# Patient Record
Sex: MALE | Race: WHITE | NOT HISPANIC OR LATINO | Employment: UNEMPLOYED | URBAN - METROPOLITAN AREA
[De-identification: names, ages, dates, MRNs, and addresses within clinical notes are randomized per-mention and may not be internally consistent; named-entity substitution may affect disease eponyms.]

---

## 2018-01-24 ENCOUNTER — APPOINTMENT (OUTPATIENT)
Dept: RADIOLOGY | Facility: MEDICAL CENTER | Age: 43
End: 2018-01-24
Attending: EMERGENCY MEDICINE
Payer: MEDICAID

## 2018-01-24 ENCOUNTER — HOSPITAL ENCOUNTER (EMERGENCY)
Facility: MEDICAL CENTER | Age: 43
End: 2018-01-24
Attending: EMERGENCY MEDICINE
Payer: MEDICAID

## 2018-01-24 VITALS
RESPIRATION RATE: 18 BRPM | SYSTOLIC BLOOD PRESSURE: 143 MMHG | BODY MASS INDEX: 36.45 KG/M2 | WEIGHT: 315 LBS | DIASTOLIC BLOOD PRESSURE: 108 MMHG | HEART RATE: 89 BPM | TEMPERATURE: 97.6 F | OXYGEN SATURATION: 94 % | HEIGHT: 78 IN

## 2018-01-24 DIAGNOSIS — M79.671 ACUTE FOOT PAIN, RIGHT: ICD-10-CM

## 2018-01-24 PROCEDURE — 73630 X-RAY EXAM OF FOOT: CPT | Mod: RT

## 2018-01-24 PROCEDURE — 99284 EMERGENCY DEPT VISIT MOD MDM: CPT

## 2018-01-24 PROCEDURE — A9270 NON-COVERED ITEM OR SERVICE: HCPCS | Performed by: EMERGENCY MEDICINE

## 2018-01-24 PROCEDURE — 700102 HCHG RX REV CODE 250 W/ 637 OVERRIDE(OP): Performed by: EMERGENCY MEDICINE

## 2018-01-24 RX ORDER — ACETAMINOPHEN 325 MG/1
650 TABLET ORAL ONCE
Status: COMPLETED | OUTPATIENT
Start: 2018-01-24 | End: 2018-01-24

## 2018-01-24 RX ORDER — IBUPROFEN 600 MG/1
600 TABLET ORAL ONCE
Status: COMPLETED | OUTPATIENT
Start: 2018-01-24 | End: 2018-01-24

## 2018-01-24 RX ORDER — IBUPROFEN 200 MG
800 TABLET ORAL EVERY 6 HOURS PRN
Status: SHIPPED | COMMUNITY
End: 2018-11-19

## 2018-01-24 RX ADMIN — IBUPROFEN 600 MG: 600 TABLET, FILM COATED ORAL at 16:36

## 2018-01-24 RX ADMIN — ACETAMINOPHEN 650 MG: 325 TABLET, FILM COATED ORAL at 16:36

## 2018-01-24 ASSESSMENT — PAIN SCALES - GENERAL: PAINLEVEL_OUTOF10: 8

## 2018-01-24 NOTE — ED NOTES
"Chief Complaint   Patient presents with   • Foot Pain     Pain and swelling to right foot for past 6 days, denies injury.      /108   Pulse (!) 102   Temp 36.4 °C (97.6 °F)   Resp 18   Ht 2.032 m (6' 8\")   Wt (!) 184.1 kg (405 lb 13.9 oz)   SpO2 95%   BMI 44.59 kg/m²     "

## 2018-01-25 NOTE — ED NOTES
Patient refused splint, given crutches per orders. Discharge instructions provided.  Pt verbalized the understanding of discharge instructions to follow up with PCP and to return to ER if condition worsens.  Pt ambulated out of ER without difficulty.

## 2018-01-25 NOTE — ED PROVIDER NOTES
"ED Provider Note    CHIEF COMPLAINT   Chief Complaint   Patient presents with   • Foot Pain     Pain and swelling to right foot for past 6 days, denies injury.        HPI   Fernandez Magallanes is a 43 y.o. male who presents to the emergency department with a chief complaint of right foot pain. The patient localizes the pain to the 2nd and 3rd toes and to the distal metatarsals. This is at her torso movement. There is no specific trauma. He is uncertain whether or not he may be kicked the bed frame in the middle of night. He's not noticed any redness or warmth.    REVIEW OF SYSTEMS   See HPI for further details. All other systems are negative.     PAST MEDICAL HISTORY   History reviewed. No pertinent past medical history.    FAMILY HISTORY  History reviewed. No pertinent family history.    SOCIAL HISTORY  Social History     Social History   • Marital status:      Spouse name: N/A   • Number of children: N/A   • Years of education: N/A     Social History Main Topics   • Smoking status: Never Smoker   • Smokeless tobacco: Never Used   • Alcohol use No   • Drug use: No   • Sexual activity: Not on file     Other Topics Concern   • Not on file     Social History Narrative   • No narrative on file       SURGICAL HISTORY  History reviewed. No pertinent surgical history.    CURRENT MEDICATIONS   Home Medications     Reviewed by Matthew Lawrence (Pharmacy Tech) on 01/24/18 at 1611  Med List Status: Complete   Medication Last Dose Status   ibuprofen (MOTRIN) 200 MG Tab > 2 days Active                ALLERGIES   No Known Allergies    PHYSICAL EXAM  VITAL SIGNS: /108   Pulse (!) 102   Temp 36.4 °C (97.6 °F)   Resp 18   Ht 2.032 m (6' 8\")   Wt (!) 184.1 kg (405 lb 13.9 oz)   SpO2 95%   BMI 44.59 kg/m²   Constitutional: Well developed, Well nourished, No acute distress, Non-toxic appearance.   Cardiovascular: Normal heart rate, Normal rhythm, No murmurs, No rubs, No gallops.   Thorax & Lungs: Normal breath " sounds, No respiratory distress, No wheezing, No chest tenderness.   Abdomen: Bowel sounds normal, Soft, No tenderness, No masses, No pulsatile masses.   Skin: Warm, Dry, No erythema, No rash.   Extremities: Intact distal pulses, No cyanosis, No clubbing.   Musculoskeletal: Tenderness to palpation of the 2nd and 3rd toes and the distal metatarsals of the right foot. No overlying erythema. No increased warmth. No external evidence of trauma.  Neurologic: Alert & oriented x 3, Normal motor function, Normal sensory function, No focal deficits noted.     RADIOLOGY/PROCEDURES  DX-FOOT-COMPLETE 3+ RIGHT   Final Result      1.  2 benign-appearing cysts in the proximal metaphysis of the middle phalanx of the 2nd and 3rd toes.      2.  No acute fracture or dislocation identified.            COURSE & MEDICAL DECISION MAKING  Pertinent Labs & Imaging studies reviewed. (See chart for details)    Patient presents today with right foot pain. X-ray does not reveal any evidence of fracture or dislocation. I'll treat the patient conservatively and give him a postop shoe. He is to minimize weightbearing and will provide crutches. He sees over-the-counter nonsteroidal anti-inflammatories for pain control. Should the patient pain that is not resolved after one week he is to follow-up at the [facility].        FINAL IMPRESSION  1. Acute foot pain, right            Electronically signed by: John Salgado, 1/24/2018 4:59 PM

## 2018-01-25 NOTE — DISCHARGE INSTRUCTIONS
Musculoskeletal Pain  Musculoskeletal pain is muscle and ameena aches and pains. These pains can occur in any part of the body. Your caregiver may treat you without knowing the cause of the pain. They may treat you if blood or urine tests, X-rays, and other tests were normal.   CAUSES  There is often not a definite cause or reason for these pains. These pains may be caused by a type of germ (virus). The discomfort may also come from overuse. Overuse includes working out too hard when your body is not fit. Ameena aches also come from weather changes. Bone is sensitive to atmospheric pressure changes.  HOME CARE INSTRUCTIONS   · Ask when your test results will be ready. Make sure you get your test results.  · Only take over-the-counter or prescription medicines for pain, discomfort, or fever as directed by your caregiver. If you were given medications for your condition, do not drive, operate machinery or power tools, or sign legal documents for 24 hours. Do not drink alcohol. Do not take sleeping pills or other medications that may interfere with treatment.  · Continue all activities unless the activities cause more pain. When the pain lessens, slowly resume normal activities. Gradually increase the intensity and duration of the activities or exercise.  · During periods of severe pain, bed rest may be helpful. Lay or sit in any position that is comfortable.  · Putting ice on the injured area.  ¨ Put ice in a bag.  ¨ Place a towel between your skin and the bag.  ¨ Leave the ice on for 15 to 20 minutes, 3 to 4 times a day.  · Follow up with your caregiver for continued problems and no reason can be found for the pain. If the pain becomes worse or does not go away, it may be necessary to repeat tests or do additional testing. Your caregiver may need to look further for a possible cause.  SEEK IMMEDIATE MEDICAL CARE IF:  · You have pain that is getting worse and is not relieved by medications.  · You develop chest pain  that is associated with shortness or breath, sweating, feeling sick to your stomach (nauseous), or throw up (vomit).  · Your pain becomes localized to the abdomen.  · You develop any new symptoms that seem different or that concern you.  MAKE SURE YOU:   · Understand these instructions.  · Will watch your condition.  · Will get help right away if you are not doing well or get worse.     This information is not intended to replace advice given to you by your health care provider. Make sure you discuss any questions you have with your health care provider.     Document Released: 12/18/2006 Document Revised: 03/11/2013 Document Reviewed: 08/22/2014  [employer] Interactive Patient Education ©2016 [employer]

## 2018-11-19 ENCOUNTER — HOSPITAL ENCOUNTER (EMERGENCY)
Facility: MEDICAL CENTER | Age: 43
End: 2018-11-19
Attending: EMERGENCY MEDICINE
Payer: MEDICAID

## 2018-11-19 ENCOUNTER — APPOINTMENT (OUTPATIENT)
Dept: RADIOLOGY | Facility: MEDICAL CENTER | Age: 43
End: 2018-11-19
Attending: EMERGENCY MEDICINE
Payer: MEDICAID

## 2018-11-19 VITALS
HEIGHT: 78 IN | BODY MASS INDEX: 36.45 KG/M2 | HEART RATE: 92 BPM | SYSTOLIC BLOOD PRESSURE: 149 MMHG | DIASTOLIC BLOOD PRESSURE: 117 MMHG | RESPIRATION RATE: 18 BRPM | OXYGEN SATURATION: 97 % | TEMPERATURE: 96.9 F | WEIGHT: 315 LBS

## 2018-11-19 DIAGNOSIS — R10.9 FLANK PAIN: ICD-10-CM

## 2018-11-19 DIAGNOSIS — M79.18 PAIN IN ABDOMINAL MUSCLE OF LEFT FLANK: ICD-10-CM

## 2018-11-19 LAB
ALBUMIN SERPL BCP-MCNC: 3.5 G/DL (ref 3.2–4.9)
ALBUMIN/GLOB SERPL: 1.2 G/DL
ALP SERPL-CCNC: 80 U/L (ref 30–99)
ALT SERPL-CCNC: 33 U/L (ref 2–50)
ANION GAP SERPL CALC-SCNC: 8 MMOL/L (ref 0–11.9)
APPEARANCE UR: CLEAR
AST SERPL-CCNC: 27 U/L (ref 12–45)
BASOPHILS # BLD AUTO: 0.7 % (ref 0–1.8)
BASOPHILS # BLD: 0.06 K/UL (ref 0–0.12)
BILIRUB SERPL-MCNC: 0.6 MG/DL (ref 0.1–1.5)
BILIRUB UR QL STRIP.AUTO: NEGATIVE
BUN SERPL-MCNC: 9 MG/DL (ref 8–22)
CALCIUM SERPL-MCNC: 8.6 MG/DL (ref 8.4–10.2)
CHLORIDE SERPL-SCNC: 103 MMOL/L (ref 96–112)
CO2 SERPL-SCNC: 24 MMOL/L (ref 20–33)
COLOR UR: YELLOW
CREAT SERPL-MCNC: 1.02 MG/DL (ref 0.5–1.4)
EOSINOPHIL # BLD AUTO: 0.3 K/UL (ref 0–0.51)
EOSINOPHIL NFR BLD: 3.6 % (ref 0–6.9)
EPI CELLS #/AREA URNS HPF: ABNORMAL /HPF
ERYTHROCYTE [DISTWIDTH] IN BLOOD BY AUTOMATED COUNT: 44.3 FL (ref 35.9–50)
GLOBULIN SER CALC-MCNC: 3 G/DL (ref 1.9–3.5)
GLUCOSE SERPL-MCNC: 162 MG/DL (ref 65–99)
GLUCOSE UR STRIP.AUTO-MCNC: NEGATIVE MG/DL
HCT VFR BLD AUTO: 46.4 % (ref 42–52)
HGB BLD-MCNC: 15.3 G/DL (ref 14–18)
IMM GRANULOCYTES # BLD AUTO: 0.02 K/UL (ref 0–0.11)
IMM GRANULOCYTES NFR BLD AUTO: 0.2 % (ref 0–0.9)
KETONES UR STRIP.AUTO-MCNC: NEGATIVE MG/DL
LEUKOCYTE ESTERASE UR QL STRIP.AUTO: NEGATIVE
LIPASE SERPL-CCNC: 39 U/L (ref 7–58)
LYMPHOCYTES # BLD AUTO: 1.89 K/UL (ref 1–4.8)
LYMPHOCYTES NFR BLD: 22.5 % (ref 22–41)
MCH RBC QN AUTO: 27.3 PG (ref 27–33)
MCHC RBC AUTO-ENTMCNC: 33 G/DL (ref 33.7–35.3)
MCV RBC AUTO: 82.7 FL (ref 81.4–97.8)
MICRO URNS: ABNORMAL
MONOCYTES # BLD AUTO: 0.57 K/UL (ref 0–0.85)
MONOCYTES NFR BLD AUTO: 6.8 % (ref 0–13.4)
MUCOUS THREADS #/AREA URNS HPF: ABNORMAL /HPF
NEUTROPHILS # BLD AUTO: 5.55 K/UL (ref 1.82–7.42)
NEUTROPHILS NFR BLD: 66.2 % (ref 44–72)
NITRITE UR QL STRIP.AUTO: NEGATIVE
NRBC # BLD AUTO: 0 K/UL
NRBC BLD-RTO: 0 /100 WBC
PH UR STRIP.AUTO: 7 [PH]
PLATELET # BLD AUTO: 301 K/UL (ref 164–446)
PMV BLD AUTO: 10.2 FL (ref 9–12.9)
POTASSIUM SERPL-SCNC: 3.5 MMOL/L (ref 3.6–5.5)
PROT SERPL-MCNC: 6.5 G/DL (ref 6–8.2)
PROT UR QL STRIP: NEGATIVE MG/DL
RBC # BLD AUTO: 5.61 M/UL (ref 4.7–6.1)
RBC # URNS HPF: ABNORMAL /HPF
RBC UR QL AUTO: ABNORMAL
SODIUM SERPL-SCNC: 135 MMOL/L (ref 135–145)
SP GR UR STRIP.AUTO: 1.02
WBC # BLD AUTO: 8.4 K/UL (ref 4.8–10.8)
WBC #/AREA URNS HPF: ABNORMAL /HPF

## 2018-11-19 PROCEDURE — 81001 URINALYSIS AUTO W/SCOPE: CPT

## 2018-11-19 PROCEDURE — 700111 HCHG RX REV CODE 636 W/ 250 OVERRIDE (IP)

## 2018-11-19 PROCEDURE — 85025 COMPLETE CBC W/AUTO DIFF WBC: CPT

## 2018-11-19 PROCEDURE — 36415 COLL VENOUS BLD VENIPUNCTURE: CPT

## 2018-11-19 PROCEDURE — 700105 HCHG RX REV CODE 258: Performed by: EMERGENCY MEDICINE

## 2018-11-19 PROCEDURE — 96375 TX/PRO/DX INJ NEW DRUG ADDON: CPT

## 2018-11-19 PROCEDURE — 96374 THER/PROPH/DIAG INJ IV PUSH: CPT

## 2018-11-19 PROCEDURE — 74176 CT ABD & PELVIS W/O CONTRAST: CPT

## 2018-11-19 PROCEDURE — 83690 ASSAY OF LIPASE: CPT

## 2018-11-19 PROCEDURE — 80053 COMPREHEN METABOLIC PANEL: CPT

## 2018-11-19 PROCEDURE — 700111 HCHG RX REV CODE 636 W/ 250 OVERRIDE (IP): Performed by: EMERGENCY MEDICINE

## 2018-11-19 PROCEDURE — 99284 EMERGENCY DEPT VISIT MOD MDM: CPT

## 2018-11-19 PROCEDURE — 76705 ECHO EXAM OF ABDOMEN: CPT

## 2018-11-19 RX ORDER — LIDOCAINE 50 MG/G
1 PATCH TOPICAL EVERY 24 HOURS
Qty: 10 PATCH | Refills: 0 | Status: SHIPPED | OUTPATIENT
Start: 2018-11-19 | End: 2019-08-27

## 2018-11-19 RX ORDER — SODIUM CHLORIDE 9 MG/ML
1000 INJECTION, SOLUTION INTRAVENOUS ONCE
Status: COMPLETED | OUTPATIENT
Start: 2018-11-19 | End: 2018-11-19

## 2018-11-19 RX ORDER — CYCLOBENZAPRINE HCL 10 MG
10 TABLET ORAL 3 TIMES DAILY PRN
Qty: 30 TAB | Refills: 0 | Status: SHIPPED | OUTPATIENT
Start: 2018-11-19 | End: 2019-08-27

## 2018-11-19 RX ORDER — IBUPROFEN 800 MG/1
800 TABLET ORAL EVERY 8 HOURS PRN
Qty: 30 TAB | Refills: 0 | Status: SHIPPED | OUTPATIENT
Start: 2018-11-19 | End: 2019-08-27

## 2018-11-19 RX ORDER — MORPHINE SULFATE 4 MG/ML
4 INJECTION, SOLUTION INTRAMUSCULAR; INTRAVENOUS ONCE
Status: COMPLETED | OUTPATIENT
Start: 2018-11-19 | End: 2018-11-19

## 2018-11-19 RX ORDER — PREDNISONE 20 MG/1
40 TABLET ORAL DAILY
Qty: 10 TAB | Refills: 0 | Status: SHIPPED | OUTPATIENT
Start: 2018-11-19 | End: 2018-11-24

## 2018-11-19 RX ORDER — KETOROLAC TROMETHAMINE 30 MG/ML
30 INJECTION, SOLUTION INTRAMUSCULAR; INTRAVENOUS ONCE
Status: COMPLETED | OUTPATIENT
Start: 2018-11-19 | End: 2018-11-19

## 2018-11-19 RX ORDER — KETOROLAC TROMETHAMINE 30 MG/ML
INJECTION, SOLUTION INTRAMUSCULAR; INTRAVENOUS
Status: COMPLETED
Start: 2018-11-19 | End: 2018-11-19

## 2018-11-19 RX ADMIN — MORPHINE SULFATE 4 MG: 4 INJECTION INTRAVENOUS at 09:09

## 2018-11-19 RX ADMIN — SODIUM CHLORIDE 1000 ML: 9 INJECTION, SOLUTION INTRAVENOUS at 07:03

## 2018-11-19 RX ADMIN — KETOROLAC TROMETHAMINE 30 MG: 30 INJECTION, SOLUTION INTRAMUSCULAR at 07:04

## 2018-11-19 RX ADMIN — KETOROLAC TROMETHAMINE 30 MG: 30 INJECTION, SOLUTION INTRAMUSCULAR; INTRAVENOUS at 07:04

## 2018-11-19 ASSESSMENT — PAIN SCALES - GENERAL
PAINLEVEL_OUTOF10: 8
PAINLEVEL_OUTOF10: 4

## 2018-11-19 ASSESSMENT — ENCOUNTER SYMPTOMS
DIARRHEA: 0
DIZZINESS: 0
FEVER: 0
ABDOMINAL PAIN: 0
HEADACHES: 0
BACK PAIN: 1
VOMITING: 0
SHORTNESS OF BREATH: 0
CHILLS: 0
FLANK PAIN: 1
NAUSEA: 0
DIAPHORESIS: 1

## 2018-11-19 ASSESSMENT — PAIN DESCRIPTION - DESCRIPTORS: DESCRIPTORS: SHARP

## 2018-11-19 NOTE — ED PROVIDER NOTES
ED Provider Note    ED Provider Note    Primary care provider: Pcp Pt States None  Means of arrival: POV  History obtained from: Patient  History limited by: None    CHIEF COMPLAINT  Chief Complaint   Patient presents with   • Flank Pain     L side x6days, progressively worse, was intermittent - now continuous       HPI  Fernandez Magallanes is a 43 y.o. male who presents to the Emergency Department with significant other with a chief complaint of left flank pain.  Patient states his been on and off for the last approximately 1 week however in the last 12 hours, it been constant.  Previously, he is he was able to find a certain position that was more comfortable but again, in the last 12 hours, nothing seems to make it better or worse.  He is never had prior similar symptoms in.  He denies any radiation to his abdomen or otherwise, with this pain.  No fever.  No nausea or vomiting.  No diarrhea.  No urinary symptoms.  No hematuria.  He has a past medical history of hypertension and congestive heart failure.  But no known kidney or lung problems.  He denies any recent trauma or falls.  Patient denies significant alcohol or drug intake.    REVIEW OF SYSTEMS  Review of Systems   Constitutional: Positive for diaphoresis. Negative for chills and fever.   HENT: Negative for congestion.    Respiratory: Negative for shortness of breath.    Cardiovascular: Negative for chest pain.   Gastrointestinal: Negative for abdominal pain, diarrhea, nausea and vomiting.   Genitourinary: Positive for flank pain.   Musculoskeletal: Positive for back pain.   Neurological: Negative for dizziness and headaches.   All other systems reviewed and are negative.      PAST MEDICAL HISTORY  Hypertension, congestive heart failure    SURGICAL HISTORY   has a past surgical history that includes knee replacement, total (Left) and knee replacement, total (Right).    SOCIAL HISTORY  Social History   Substance Use Topics   • Smoking status: Never Smoker   •  "Smokeless tobacco: Never Used   • Alcohol use No      History   Drug Use No       FAMILY HISTORY  History reviewed. No pertinent family history.    CURRENT MEDICATIONS  Home Medications     Reviewed by Matthew Cheatham (Pharmacy Tech) on 11/19/18 at 0732  Med List Status: Complete   Medication Last Dose Status   ibuprofen (MOTRIN) 200 MG Tab 11/19/2018 Active                ALLERGIES  No Known Allergies    PHYSICAL EXAM  VITAL SIGNS: /117   Pulse 92   Temp 36.1 °C (96.9 °F) (Oral)   Resp 18   Ht 2.032 m (6' 8\")   Wt (!) 156.2 kg (344 lb 5.7 oz)   SpO2 97%   BMI 37.83 kg/m²   Vitals reviewed.  Constitutional: Patient is oriented to person, place, and time. Appears well-developed and well-nourished.  Mild distress.    Head: Normocephalic and atraumatic.   Ears: Normal external ears bilaterally.   Mouth/Throat: Oropharynx is clear and moist  Eyes: Conjunctivae are normal. Pupils are equal, round, and reactive to light.   Cardiovascular: Tachycardia, regular rhythm and normal heart sounds. Normal peripheral pulses.  Pulmonary/Chest: Effort normal and breath sounds normal. No respiratory distress, no wheezes, rhonchi, or rales.   Abdominal: Soft. Bowel sounds are normal. There is no tenderness. No rebound or guarding, or peritoneal signs.  Left CVA tenderness.  Musculoskeletal: No edema and no tenderness.   Neurological: No focal deficits.   Skin: Skin is warm.  Patient is diaphoretic.  no erythema. No pallor.   Psychiatric: Patient has a normal mood and affect.     LABS  Results for orders placed or performed during the hospital encounter of 11/19/18   CBC WITH DIFFERENTIAL   Result Value Ref Range    WBC 8.4 4.8 - 10.8 K/uL    RBC 5.61 4.70 - 6.10 M/uL    Hemoglobin 15.3 14.0 - 18.0 g/dL    Hematocrit 46.4 42.0 - 52.0 %    MCV 82.7 81.4 - 97.8 fL    MCH 27.3 27.0 - 33.0 pg    MCHC 33.0 (L) 33.7 - 35.3 g/dL    RDW 44.3 35.9 - 50.0 fL    Platelet Count 301 164 - 446 K/uL    MPV 10.2 9.0 - 12.9 fL    " Neutrophils-Polys 66.20 44.00 - 72.00 %    Lymphocytes 22.50 22.00 - 41.00 %    Monocytes 6.80 0.00 - 13.40 %    Eosinophils 3.60 0.00 - 6.90 %    Basophils 0.70 0.00 - 1.80 %    Immature Granulocytes 0.20 0.00 - 0.90 %    Nucleated RBC 0.00 /100 WBC    Neutrophils (Absolute) 5.55 1.82 - 7.42 K/uL    Lymphs (Absolute) 1.89 1.00 - 4.80 K/uL    Monos (Absolute) 0.57 0.00 - 0.85 K/uL    Eos (Absolute) 0.30 0.00 - 0.51 K/uL    Baso (Absolute) 0.06 0.00 - 0.12 K/uL    Immature Granulocytes (abs) 0.02 0.00 - 0.11 K/uL    NRBC (Absolute) 0.00 K/uL   COMP METABOLIC PANEL   Result Value Ref Range    Sodium 135 135 - 145 mmol/L    Potassium 3.5 (L) 3.6 - 5.5 mmol/L    Chloride 103 96 - 112 mmol/L    Co2 24 20 - 33 mmol/L    Anion Gap 8.0 0.0 - 11.9    Glucose 162 (H) 65 - 99 mg/dL    Bun 9 8 - 22 mg/dL    Creatinine 1.02 0.50 - 1.40 mg/dL    Calcium 8.6 8.4 - 10.2 mg/dL    AST(SGOT) 27 12 - 45 U/L    ALT(SGPT) 33 2 - 50 U/L    Alkaline Phosphatase 80 30 - 99 U/L    Total Bilirubin 0.6 0.1 - 1.5 mg/dL    Albumin 3.5 3.2 - 4.9 g/dL    Total Protein 6.5 6.0 - 8.2 g/dL    Globulin 3.0 1.9 - 3.5 g/dL    A-G Ratio 1.2 g/dL   LIPASE   Result Value Ref Range    Lipase 39 7 - 58 U/L   URINALYSIS CULTURE, IF INDICATED   Result Value Ref Range    Color Yellow     Character Clear     Specific Gravity 1.020 <1.035    Ph 7.0 5.0 - 8.0    Glucose Negative Negative mg/dL    Ketones Negative Negative mg/dL    Protein Negative Negative mg/dL    Bilirubin Negative Negative    Nitrite Negative Negative    Leukocyte Esterase Negative Negative    Occult Blood Trace (A) Negative    Micro Urine Req Microscopic    URINE MICROSCOPIC (W/UA)   Result Value Ref Range    WBC Rare (A) /hpf    RBC 0-2 (A) /hpf    Epithelial Cells Rare Few /hpf    Mucous Threads Few /hpf   ESTIMATED GFR   Result Value Ref Range    GFR If African American >60 >60 mL/min/1.73 m 2    GFR If Non African American >60 >60 mL/min/1.73 m 2       All labs reviewed by  me.    RADIOLOGY  US-RUQ   Final Result      1.  Thickening gallbladder wall which is a nonspecific finding and can be seen in variety of conditions to include hepatocellular dysfunction, hyperplastic cholecystosis disease as well as cholecystitis.      2.  No gallstones or biliary ductal dilatation. No para cholecystic fluid.      3.  Hepatomegaly.      4.  Borderline dilated portal vein.      CT-RENAL COLIC EVALUATION(A/P W/O)   Final Result      1.  Small bilateral renal calyceal stones which measure up to 3 mm in size. There is no evidence of ureteral stone or evidence of hydronephrosis.      2.  Inflammatory stranding seen surrounding the gallbladder and extending into the periportal region. Consideration should be given for cholecystitis.      3.  Normal appendix.        The radiologist's interpretation of all radiological studies have been reviewed by me.    COURSE & MEDICAL DECISION MAKING  Pertinent Labs & Imaging studies reviewed. (See chart for details)    Obtained and reviewed past medical records.  Patient has only 1 other encounter, it was in January of this year, for foot pain.    6:35 AM - Patient seen and examined at bedside.  This is a pleasant, 43-year-old male who presents with a week of intermittent left flank pain, now constant pain.  He is tachycardic.  Patient will be given IV fluids for abnormal vital signs, tachycardia and the possibility that surgical intervention will be necessary, he will not be given oral fluids.  She will be treated with Toradol.  IV started per nursing protocols.  Differential diagnosis includes but not limited to: Kidney stone, pancreatitis, muscle strain.    8:55 AM is reevaluated at the bedside.  He reports only minimal change in pain.  Originally, he states his pain was 8 out of 10 now he reports that 6 out of 10.  We discussed CT findings which show evidence of bilateral nonobstructing kidney stones but suspicion for gallbladder inflammation and I have advised  him of plan of care to obtain ultrasound at this time.  He will also be given pain medication.  He will continue to be kept n.p.o. for possible need for surgical intervention.  I have advised him, labs were overall unrevealing other than, an elevated sugar of 162 and a slightly low potassium 3.5.  LFTs and lipase normal.    11:40 AM data reviewed.  Her sound results noted.  T showed concern for possible cholecystitis despite the patient not having any right upper quadrant pain and normal liver enzymes.  Ultrasound does not show evidence of acute cholecystitis.    11:41 AM patient's reevaluated the bedside.  Only minimal change.  We discussed ultrasound findings.  No evidence of acute cholecystitis.  Now is considering more likely musculoskeletal injury after lifting.  I suggested treatment with lidocaine patch, anti-inflammatories, short course of steroids and a muscle relaxer.  He is also to avoid lifting bending and activities that cause pain.  Of advised application of ice.  At this time, I feel he can safely be discharged home.  He is well-appearing and nontoxic with normal vital signs.    She will be discharged home in stable condition.      FINAL IMPRESSION  1. Flank pain    2. Pain in abdominal muscle of left flank

## 2018-11-19 NOTE — ED TRIAGE NOTES
"Chief Complaint   Patient presents with   • Flank Pain     L side x6days, progressively worse, was intermittent - now continuous   /117   Pulse (!) 106   Temp 36.1 °C (96.9 °F) (Oral)   Resp 18   Ht 2.032 m (6' 8\")   Wt (!) 156.2 kg (344 lb 5.7 oz)   SpO2 95%   BMI 37.83 kg/m²      Pt frequently repositions self; unable to find comfortable position. Denies n/v or urinary changes. Denies change in appetite. Reports 'I thought this was just back pain, but it keeps getting worse.'  "

## 2018-11-19 NOTE — ED NOTES
Discharge instructions provided and discussed.  Rx provided and discussed.  Patient verbalizes a need to follow up with a PCP to have his BP treated.

## 2019-03-23 ENCOUNTER — HOSPITAL ENCOUNTER (EMERGENCY)
Facility: MEDICAL CENTER | Age: 44
End: 2019-03-23
Attending: EMERGENCY MEDICINE
Payer: MEDICAID

## 2019-03-23 VITALS
SYSTOLIC BLOOD PRESSURE: 152 MMHG | TEMPERATURE: 98.7 F | HEIGHT: 78 IN | BODY MASS INDEX: 36.45 KG/M2 | DIASTOLIC BLOOD PRESSURE: 114 MMHG | RESPIRATION RATE: 16 BRPM | HEART RATE: 90 BPM | OXYGEN SATURATION: 96 % | WEIGHT: 315 LBS

## 2019-03-23 DIAGNOSIS — F15.10 METHAMPHETAMINE ABUSE (HCC): ICD-10-CM

## 2019-03-23 DIAGNOSIS — I10 HYPERTENSION, UNSPECIFIED TYPE: ICD-10-CM

## 2019-03-23 DIAGNOSIS — Z00.8 MEDICAL CLEARANCE FOR INCARCERATION: ICD-10-CM

## 2019-03-23 PROCEDURE — 99284 EMERGENCY DEPT VISIT MOD MDM: CPT

## 2019-03-23 PROCEDURE — A9270 NON-COVERED ITEM OR SERVICE: HCPCS | Performed by: EMERGENCY MEDICINE

## 2019-03-23 PROCEDURE — 700102 HCHG RX REV CODE 250 W/ 637 OVERRIDE(OP): Performed by: EMERGENCY MEDICINE

## 2019-03-23 RX ORDER — CLONIDINE HYDROCHLORIDE 0.1 MG/1
0.1 TABLET ORAL ONCE
Status: COMPLETED | OUTPATIENT
Start: 2019-03-23 | End: 2019-03-23

## 2019-03-23 RX ADMIN — CLONIDINE HYDROCHLORIDE 0.1 MG: 0.1 TABLET ORAL at 08:30

## 2019-03-23 NOTE — ED TRIAGE NOTES
Patient to ED via [facility] for medical clearance. Patient was found to be hypertensive at the station but denies any associated sx. Does take BP meds and took them this morning but does not remember what they are. Patient did also use meth this morning.

## 2019-03-23 NOTE — ED NOTES
Pt provided with water per the ERP. Pt remains in handcuffs and with a [facility]  at bedside. Pt is calm, cooperative and appropriate.

## 2019-03-23 NOTE — ED PROVIDER NOTES
ED Provider Note    CHIEF COMPLAINT  Chief Complaint   Patient presents with   • Medical Clearance       HPI  Fernandez Magallanes is a 44 y.o. male who presents in custody of the police, found to be hypertensive.  Patient states history of similar.  He also used methamphetamine this morning.  No chest pain or headache.  No difficulty breathing.  He denies head injury.  Patient requires medical clearance to continue on to snf.  Patient states he does not currently take medication for his blood pressure.    REVIEW OF SYSTEMS  Constitutional: No fever  Respiratory: No shortness of breath  Cardiac: No chest pain  Gastrointestinal: No abdominal pain  Musculoskeletal: No acute back pain    PAST MEDICAL HISTORY  Past Medical History:   Diagnosis Date   • Congestive heart failure (HCC)    • Hypertension        FAMILY HISTORY  No family history on file.    SOCIAL HISTORY  Social History     Social History   • Marital status:      Spouse name: N/A   • Number of children: N/A   • Years of education: N/A     Social History Main Topics   • Smoking status: Never Smoker   • Smokeless tobacco: Never Used   • Alcohol use No   • Drug use: No   • Sexual activity: Not on file     Other Topics Concern   • Not on file     Social History Narrative   • No narrative on file       SURGICAL HISTORY  Past Surgical History:   Procedure Laterality Date   • KNEE REPLACEMENT, TOTAL Left    • KNEE REPLACEMENT, TOTAL Right        CURRENT MEDICATIONS  No current facility-administered medications on file prior to encounter.      Current Outpatient Prescriptions on File Prior to Encounter   Medication Sig Dispense Refill   • lidocaine (LIDODERM) 5 % Patch Apply 1 Patch to skin as directed every 24 hours. 10 Patch 0   • cyclobenzaprine (FLEXERIL) 10 MG Tab Take 1 Tab by mouth 3 times a day as needed. 30 Tab 0   • ibuprofen (MOTRIN) 800 MG Tab Take 1 Tab by mouth every 8 hours as needed. 30 Tab 0       ALLERGIES  No Known Allergies    PHYSICAL  "EXAM  VITAL SIGNS: /114   Pulse 90   Temp 37.1 °C (98.7 °F) (Temporal)   Resp 16   Ht 2.032 m (6' 8\")   Wt (!) 158.8 kg (350 lb)   SpO2 96%   BMI 38.45 kg/m²   Constitutional:  Well nourished, No acute distress.   HENT: Face is atraumatic.  No epistaxis  GI: Abdomen is soft and nontender  Eyes:  Conjunctiva normal, No discharge.  Pupils are equal, no nystagmus  Cardiovascular: The heart is regular rhythm, normal rate  Pulmonary: Lungs are clear, no crackles or wheezing  Skin: No cyanosis.  No asymmetric edema the lower extremities  Musculoskeletal: Neck nontender   Neurologic: speech is clear, no ataxia.  Strength normal  Psychiatric:  Mood depressed.  Cooperative      COURSE & MEDICAL DECISION MAKING  Pertinent Labs & Imaging studies reviewed. (See chart for details)  After clonidine, blood pressure improved, down to 146/96.  Pressure then wavered slightly up and down.  At this time patient remains asymptomatic.  Suspect of hypertension today to be combination of pre-existing disease as well as methamphetamine use.  Patient is advised to return for headache, chest pain.  He is discharged stable condition care and please    FINAL IMPRESSION     1. Medical clearance for incarceration    2. Methamphetamine abuse (HCC)    3. Hypertension, unspecified type                 Electronically signed by: Justin Rai, 3/23/2019 1:25 PM    "

## 2019-03-23 NOTE — ED NOTES
Pt responsive to clonidine, but PD still not comfortable with where BP is. ERP aware. ERP clearing patient to go to snf.

## 2019-08-27 DIAGNOSIS — Z01.810 PRE-OPERATIVE CARDIOVASCULAR EXAMINATION: ICD-10-CM

## 2019-08-27 LAB — EKG IMPRESSION: NORMAL

## 2019-08-27 PROCEDURE — 93005 ELECTROCARDIOGRAM TRACING: CPT | Performed by: SURGERY

## 2019-08-27 PROCEDURE — 93010 ELECTROCARDIOGRAM REPORT: CPT | Performed by: INTERNAL MEDICINE

## 2019-08-27 RX ORDER — CARVEDILOL 6.25 MG/1
6.25 TABLET ORAL 2 TIMES DAILY WITH MEALS
COMMUNITY
Start: 2018-12-13

## 2019-08-30 ENCOUNTER — ANESTHESIA EVENT (OUTPATIENT)
Dept: SURGERY | Facility: MEDICAL CENTER | Age: 44
End: 2019-08-30
Payer: MEDICAID

## 2019-08-30 ENCOUNTER — HOSPITAL ENCOUNTER (OUTPATIENT)
Facility: MEDICAL CENTER | Age: 44
End: 2019-08-30
Attending: SURGERY | Admitting: SURGERY
Payer: MEDICAID

## 2019-08-30 ENCOUNTER — ANESTHESIA (OUTPATIENT)
Dept: SURGERY | Facility: MEDICAL CENTER | Age: 44
End: 2019-08-30
Payer: MEDICAID

## 2019-08-30 VITALS
HEART RATE: 54 BPM | RESPIRATION RATE: 16 BRPM | TEMPERATURE: 96.8 F | HEIGHT: 78 IN | WEIGHT: 315 LBS | OXYGEN SATURATION: 93 % | BODY MASS INDEX: 36.45 KG/M2

## 2019-08-30 DIAGNOSIS — G89.18 POSTOPERATIVE PAIN: ICD-10-CM

## 2019-08-30 PROCEDURE — 160002 HCHG RECOVERY MINUTES (STAT): Performed by: SURGERY

## 2019-08-30 PROCEDURE — 160047 HCHG PACU  - EA ADDL 30 MINS PHASE II: Performed by: SURGERY

## 2019-08-30 PROCEDURE — 501583 HCHG TROCAR, THRD CAN&SEAL 5X100: Performed by: SURGERY

## 2019-08-30 PROCEDURE — A4314 CATH W/DRAINAGE 2-WAY LATEX: HCPCS | Performed by: SURGERY

## 2019-08-30 PROCEDURE — 160046 HCHG PACU - 1ST 60 MINS PHASE II: Performed by: SURGERY

## 2019-08-30 PROCEDURE — A6402 STERILE GAUZE <= 16 SQ IN: HCPCS | Performed by: SURGERY

## 2019-08-30 PROCEDURE — 501568 HCHG TROCAR, BLUNTPORT 12MM: Performed by: SURGERY

## 2019-08-30 PROCEDURE — 700101 HCHG RX REV CODE 250: Performed by: SURGERY

## 2019-08-30 PROCEDURE — 700105 HCHG RX REV CODE 258: Performed by: SURGERY

## 2019-08-30 PROCEDURE — 160048 HCHG OR STATISTICAL LEVEL 1-5: Performed by: SURGERY

## 2019-08-30 PROCEDURE — 502571 HCHG PACK, LAP CHOLE: Performed by: SURGERY

## 2019-08-30 PROCEDURE — C1781 MESH (IMPLANTABLE): HCPCS | Performed by: SURGERY

## 2019-08-30 PROCEDURE — 160039 HCHG SURGERY MINUTES - EA ADDL 1 MIN LEVEL 3: Performed by: SURGERY

## 2019-08-30 PROCEDURE — 160025 RECOVERY II MINUTES (STATS): Performed by: SURGERY

## 2019-08-30 PROCEDURE — 700111 HCHG RX REV CODE 636 W/ 250 OVERRIDE (IP): Performed by: ANESTHESIOLOGY

## 2019-08-30 PROCEDURE — 700101 HCHG RX REV CODE 250: Performed by: ANESTHESIOLOGY

## 2019-08-30 PROCEDURE — 502240 HCHG MISC OR SUPPLY RC 0272: Performed by: SURGERY

## 2019-08-30 PROCEDURE — 160035 HCHG PACU - 1ST 60 MINS PHASE I: Performed by: SURGERY

## 2019-08-30 PROCEDURE — 160028 HCHG SURGERY MINUTES - 1ST 30 MINS LEVEL 3: Performed by: SURGERY

## 2019-08-30 PROCEDURE — 501570 HCHG TROCAR, SEPARATOR: Performed by: SURGERY

## 2019-08-30 PROCEDURE — 160009 HCHG ANES TIME/MIN: Performed by: SURGERY

## 2019-08-30 DEVICE — MESH 3D MAX RIGHT 10.8 X 16CM - LARGE (1EA/CA): Type: IMPLANTABLE DEVICE | Status: FUNCTIONAL

## 2019-08-30 RX ORDER — MEPERIDINE HYDROCHLORIDE 25 MG/ML
12.5 INJECTION INTRAMUSCULAR; INTRAVENOUS; SUBCUTANEOUS
Status: DISCONTINUED | OUTPATIENT
Start: 2019-08-30 | End: 2019-08-30 | Stop reason: HOSPADM

## 2019-08-30 RX ORDER — SODIUM CHLORIDE, SODIUM LACTATE, POTASSIUM CHLORIDE, CALCIUM CHLORIDE 600; 310; 30; 20 MG/100ML; MG/100ML; MG/100ML; MG/100ML
INJECTION, SOLUTION INTRAVENOUS CONTINUOUS
Status: DISCONTINUED | OUTPATIENT
Start: 2019-08-30 | End: 2019-08-30 | Stop reason: HOSPADM

## 2019-08-30 RX ORDER — HYDROCODONE BITARTRATE AND ACETAMINOPHEN 5; 325 MG/1; MG/1
1-2 TABLET ORAL EVERY 6 HOURS PRN
Qty: 12 TAB | Refills: 0 | Status: SHIPPED | OUTPATIENT
Start: 2019-08-30 | End: 2019-09-02

## 2019-08-30 RX ORDER — OXYCODONE HYDROCHLORIDE AND ACETAMINOPHEN 5; 325 MG/1; MG/1
1 TABLET ORAL
Status: DISCONTINUED | OUTPATIENT
Start: 2019-08-30 | End: 2019-08-30 | Stop reason: HOSPADM

## 2019-08-30 RX ORDER — ONDANSETRON 2 MG/ML
INJECTION INTRAMUSCULAR; INTRAVENOUS PRN
Status: DISCONTINUED | OUTPATIENT
Start: 2019-08-30 | End: 2019-08-30 | Stop reason: SURG

## 2019-08-30 RX ORDER — ONDANSETRON 2 MG/ML
4 INJECTION INTRAMUSCULAR; INTRAVENOUS
Status: COMPLETED | OUTPATIENT
Start: 2019-08-30 | End: 2019-08-30

## 2019-08-30 RX ORDER — HALOPERIDOL 5 MG/ML
1 INJECTION INTRAMUSCULAR
Status: DISCONTINUED | OUTPATIENT
Start: 2019-08-30 | End: 2019-08-30 | Stop reason: HOSPADM

## 2019-08-30 RX ORDER — KETOROLAC TROMETHAMINE 30 MG/ML
30 INJECTION, SOLUTION INTRAMUSCULAR; INTRAVENOUS ONCE
Status: COMPLETED | OUTPATIENT
Start: 2019-08-30 | End: 2019-08-30

## 2019-08-30 RX ORDER — HYDROMORPHONE HYDROCHLORIDE 1 MG/ML
0.1 INJECTION, SOLUTION INTRAMUSCULAR; INTRAVENOUS; SUBCUTANEOUS
Status: DISCONTINUED | OUTPATIENT
Start: 2019-08-30 | End: 2019-08-30 | Stop reason: HOSPADM

## 2019-08-30 RX ORDER — BUPIVACAINE HYDROCHLORIDE AND EPINEPHRINE 5; 5 MG/ML; UG/ML
INJECTION, SOLUTION EPIDURAL; INTRACAUDAL; PERINEURAL
Status: DISCONTINUED | OUTPATIENT
Start: 2019-08-30 | End: 2019-08-30 | Stop reason: HOSPADM

## 2019-08-30 RX ORDER — OXYCODONE HYDROCHLORIDE AND ACETAMINOPHEN 5; 325 MG/1; MG/1
2 TABLET ORAL
Status: DISCONTINUED | OUTPATIENT
Start: 2019-08-30 | End: 2019-08-30 | Stop reason: HOSPADM

## 2019-08-30 RX ORDER — HYDROMORPHONE HYDROCHLORIDE 1 MG/ML
0.2 INJECTION, SOLUTION INTRAMUSCULAR; INTRAVENOUS; SUBCUTANEOUS
Status: DISCONTINUED | OUTPATIENT
Start: 2019-08-30 | End: 2019-08-30 | Stop reason: HOSPADM

## 2019-08-30 RX ORDER — ROCURONIUM BROMIDE 10 MG/ML
INJECTION, SOLUTION INTRAVENOUS PRN
Status: DISCONTINUED | OUTPATIENT
Start: 2019-08-30 | End: 2019-08-30 | Stop reason: SURG

## 2019-08-30 RX ORDER — CEFAZOLIN SODIUM 1 G/3ML
INJECTION, POWDER, FOR SOLUTION INTRAMUSCULAR; INTRAVENOUS PRN
Status: DISCONTINUED | OUTPATIENT
Start: 2019-08-30 | End: 2019-08-30 | Stop reason: SURG

## 2019-08-30 RX ORDER — DIPHENHYDRAMINE HYDROCHLORIDE 50 MG/ML
12.5 INJECTION INTRAMUSCULAR; INTRAVENOUS
Status: DISCONTINUED | OUTPATIENT
Start: 2019-08-30 | End: 2019-08-30 | Stop reason: HOSPADM

## 2019-08-30 RX ORDER — HYDROMORPHONE HYDROCHLORIDE 1 MG/ML
0.4 INJECTION, SOLUTION INTRAMUSCULAR; INTRAVENOUS; SUBCUTANEOUS
Status: DISCONTINUED | OUTPATIENT
Start: 2019-08-30 | End: 2019-08-30 | Stop reason: HOSPADM

## 2019-08-30 RX ADMIN — PROPOFOL 250 MG: 10 INJECTION, EMULSION INTRAVENOUS at 14:01

## 2019-08-30 RX ADMIN — KETOROLAC TROMETHAMINE 30 MG: 30 INJECTION, SOLUTION INTRAMUSCULAR at 16:26

## 2019-08-30 RX ADMIN — SODIUM CHLORIDE, POTASSIUM CHLORIDE, SODIUM LACTATE AND CALCIUM CHLORIDE: 600; 310; 30; 20 INJECTION, SOLUTION INTRAVENOUS at 11:46

## 2019-08-30 RX ADMIN — FENTANYL CITRATE 50 MCG: 50 INJECTION, SOLUTION INTRAMUSCULAR; INTRAVENOUS at 15:06

## 2019-08-30 RX ADMIN — LIDOCAINE HYDROCHLORIDE 0.5 ML: 10 INJECTION, SOLUTION INFILTRATION; PERINEURAL at 11:46

## 2019-08-30 RX ADMIN — ROCURONIUM BROMIDE 50 MG: 10 INJECTION, SOLUTION INTRAVENOUS at 13:56

## 2019-08-30 RX ADMIN — SUGAMMADEX 200 MG: 100 INJECTION, SOLUTION INTRAVENOUS at 14:52

## 2019-08-30 RX ADMIN — ONDANSETRON 4 MG: 2 INJECTION INTRAMUSCULAR; INTRAVENOUS at 15:30

## 2019-08-30 RX ADMIN — ONDANSETRON 4 MG: 2 INJECTION INTRAMUSCULAR; INTRAVENOUS at 14:52

## 2019-08-30 RX ADMIN — CEFAZOLIN 2 G: 1 INJECTION, POWDER, FOR SOLUTION INTRAVENOUS at 13:46

## 2019-08-30 RX ADMIN — SUCCINYLCHOLINE CHLORIDE 100 MG: 20 INJECTION, SOLUTION INTRAMUSCULAR; INTRAVENOUS at 13:56

## 2019-08-30 RX ADMIN — FENTANYL CITRATE 250 MCG: 50 INJECTION, SOLUTION INTRAMUSCULAR; INTRAVENOUS at 13:56

## 2019-08-30 NOTE — DISCHARGE INSTR - OTHER INFO
Discharge home when alert, comfortable, ambulatory, and tolerating PO well.  Pt counseled re: diet, activity, home med's, and wound care.  Regular diet.  May shower over Tegaderms tomorrow.   Ok to remove Tegaderms on 9/02/19. May continue to shower once bandages removed, but no baths/soaks x 2 weeks.  No driving for 4-5 days.  No lifting >15 lbs for 3-4 weeks.  F/U with Dr. Ganser in 1-2 weeks

## 2019-08-30 NOTE — ANESTHESIA PREPROCEDURE EVALUATION
Relevant Problems   No relevant active problems       Physical Exam    Airway   Mallampati: II  TM distance: >3 FB  Neck ROM: full       Cardiovascular - normal exam  Rhythm: regular  Rate: normal  (-) murmur     Dental - normal exam         Pulmonary - normal exam  Breath sounds clear to auscultation     Abdominal    Neurological - normal exam                 Anesthesia Plan    ASA 3   ASA physical status 3 criteria: morbid obesity - BMI greater than or equal to 40    Plan - general       Airway plan will be ETT  (Chf)      Induction: intravenous    Postoperative Plan: Postoperative administration of opioids is intended.    Pertinent diagnostic labs and testing reviewed    Informed Consent:    Anesthetic plan and risks discussed with patient.    Use of blood products discussed with: patient whom consented to blood products.

## 2019-08-30 NOTE — OR NURSING
1452 received from or  resp spont  abd soft w  ith 3 dressings c/d/i  Medicated for mild pain with good results 1530 dressings remain c/d/i  Medicated for slight nausea 1545 nausea gone per pt  Meets discharge criteria

## 2019-08-30 NOTE — OR SURGEON
Immediate Post OP Note    PreOp Diagnosis: Right inguinal hernia    PostOp Diagnosis: Same    Procedure(s):  REPAIR, HERNIA, RIGHT INGUINAL, LAPAROSCOPIC - Wound Class: Clean    Surgeon(s):  John H Ganser, M.D.    Anesthesiologist/Type of Anesthesia:  Anesthesiologist: Scotty Schwartz M.D./General    Surgical Staff:  Assistant: KURT Winter  Circulator: Emily Pacheco R.N.  Scrub Person: Joe Santana    Specimens removed if any:  * No specimens in log *    Estimated Blood Loss: -    Findings: Indirect    Complications: 0        8/30/2019 2:44 PM John H Ganser, M.D.

## 2019-08-30 NOTE — OR NURSING
1635- Discharge instructions provided to patient and girlfriend. Both verbalized understanding. All questions and concerns addressed. Awaiting arrival of ride.   1710- Patient D/Jhon to care of family following an uneventful stay in Stage 2.

## 2019-08-30 NOTE — ANESTHESIA PROCEDURE NOTES
Airway  Date/Time: 8/30/2019 1:57 PM  Performed by: Scotty Schwartz M.D.  Authorized by: Scotty Schwartz M.D.     Location:  OR  Urgency:  Elective  Indications for Airway Management:  Anesthesia  Spontaneous Ventilation: absent    Sedation Level:  Deep  Preoxygenated: Yes    Patient Position:  Sniffing  Final Airway Type:  Endotracheal airway  Final Endotracheal Airway:  ETT  Cuffed: Yes    Technique Used for Successful ETT Placement:  Direct laryngoscopy  Insertion Site:  Oral  Blade Type:  Torin  Laryngoscope Blade/Videolaryngoscope Blade Size:  4  ETT Size (mm):  8.0  Measured from:  Teeth  ETT to Teeth (cm):  26  Placement Verified by: auscultation and capnometry    Cormack-Lehane Classification:  Grade I - full view of glottis  Number of Attempts at Approach:  1

## 2019-08-30 NOTE — DISCHARGE INSTRUCTIONS
ACTIVITY: Rest and take it easy for the first 24 hours.  A responsible adult is recommended to remain with you during that time.  It is normal to feel sleepy.  We encourage you to not do anything that requires balance, judgment or coordination.    MILD FLU-LIKE SYMPTOMS ARE NORMAL. YOU MAY EXPERIENCE GENERALIZED MUSCLE ACHES, THROAT IRRITATION, HEADACHE AND/OR SOME NAUSEA.    FOR 24 HOURS DO NOT:  Drive, operate machinery or run household appliances.  Drink beer or alcoholic beverages.   Make important decisions or sign legal documents.    SPECIAL INSTRUCTIONS: No heavy lifting until ok's by Md    DIET: To avoid nausea, slowly advance diet as tolerated, avoiding spicy or greasy foods for the first day.  Add more substantial food to your diet according to your physician's instructions.  Babies can be fed formula or breast milk as soon as they are hungry.  INCREASE FLUIDS AND FIBER TO AVOID CONSTIPATION.    SURGICAL DRESSING/BATHING: Keep clean dry and intact per Md instructions    FOLLOW-UP APPOINTMENT:  A follow-up appointment should be arranged with your doctor in ; call to schedule.    You should CALL YOUR PHYSICIAN if you develop:  Fever greater than 101 degrees F.  Pain not relieved by medication, or persistent nausea or vomiting.  Excessive bleeding (blood soaking through dressing) or unexpected drainage from the wound.  Extreme redness or swelling around the incision site, drainage of pus or foul smelling drainage.  Inability to urinate or empty your bladder within 8 hours.  Problems with breathing or chest pain.    You should call 911 if you develop problems with breathing or chest pain.  If you are unable to contact your doctor or surgical center, you should go to the nearest emergency room or urgent care center.  Physician's telephone #: [phone]    If any questions arise, call your doctor.  If your doctor is not available, please feel free to call the Surgical Center at {Surgical Dept Numbers:[phone]}.   The Center is open Monday through Friday from 7AM to 7PM.  You can also call the HEALTH HOTLINE open 24 hours/day, 7 days/week and speak to a nurse at [phone], or toll free at [phone].    A registered nurse may call you a few days after your surgery to see how you are doing after your procedure.    MEDICATIONS: Resume taking daily medication.  Take prescribed pain medication with food.  If no medication is prescribed, you may take non-aspirin pain medication if needed.  PAIN MEDICATION CAN BE VERY CONSTIPATING.  Take a stool softener or laxative such as senokot, pericolace, or milk of magnesia if needed.    Prescription given for Norco.  Last pain medication given at     If your physician has prescribed pain medication that includes Acetaminophen (Tylenol), do not take additional Acetaminophen (Tylenol) while taking the prescribed medication.    Depression / Suicide Risk    As you are discharged from this [facility] facility, it is important to learn how to keep safe from harming yourself.    Recognize the warning signs:  · Abrupt changes in personality, positive or negative- including increase in energy   · Giving away possessions  · Change in eating patterns- significant weight changes-  positive or negative  · Change in sleeping patterns- unable to sleep or sleeping all the time   · Unwillingness or inability to communicate  · Depression  · Unusual sadness, discouragement and loneliness  · Talk of wanting to die  · Neglect of personal appearance   · Rebelliousness- reckless behavior  · Withdrawal from people/activities they love  · Confusion- inability to concentrate     If you or a loved one observes any of these behaviors or has concerns about self-harm, here's what you can do:  · Talk about it- your feelings and reasons for harming yourself  · Remove any means that you might use to hurt yourself (examples: pills, rope, extension cords, firearm)  · Get professional help from the community  (Mental Health, Substance Abuse, psychological counseling)  · Do not be alone:Call your Safe Contact- someone whom you trust who will be there for you.  · Call your local CRISIS HOTLINE 763-9334 or 924-688-5410  · Call your local Children's Mobile Crisis Response Team [place] (170) 635-1692 or www.InfoHubble  · Call the toll free National Suicide Prevention Hotlines   · National Suicide Prevention Lifeline 815-439-NZVQ (0348)  · National Hope Line Network 800-SUICIDE (901-8548)

## 2019-08-30 NOTE — OP REPORT
DATE OF SERVICE:  08/30/2019    PREOPERATIVE DIAGNOSIS:  Right inguinal hernia.    POSTOPERATIVE DIAGNOSIS:  Indirect right inguinal hernia.    PROCEDURE PERFORMED:  Laparoscopic repair of indirect right inguinal hernia   with mesh, large Bard 3DMax.    SURGEON:  John H. Ganser, MD    ASSISTANT:  Jose Go PA-C    ANESTHESIA:  General.    ANESTHESIOLOGIST:  Scotty Schwartz MD    INDICATIONS:  The patient is a 44-year-old male who has developed an enlarging   right groin bulge over the last couple of months.  Ultrasound confirms   hernia.  Risks, benefits, and alternatives to laparoscopic repair of right   inguinal hernia repair with mesh were outlined in detail.  All questions   answered and he wished to proceed.    DESCRIPTION OF PROCEDURE:  The patient was identified and general anesthetic   administered.  His abdomen was prepped and draped in the usual sterile   fashion.  Local anesthesia of 0.5% Marcaine with epinephrine was injected   prior to making skin incision.  A small incision was made transversely below   the umbilicus and dissection carried through the subcutaneous tissues down to   the fascia.  The fascia was opened vertically short distance and a plane   created between the musculature of the posterior fascia.  Balloon dissector   was inserted into this plane and advanced.  The camera was inserted and the   balloon inflated.  Initially, it appeared to be in good position, but the   peritoneum tore on inflating the balloon off to the right side.  This was   removed and the Agueda balloon trocar was inserted and the peritoneum   insufflated with carbon dioxide.  The tear was below the posterior fascial   layer.  The posterior fascial layer was left intact below the fascial   incision.  Five millimeter trocars were placed on either side of midline under   direct vision.  The peritoneum was then scored anteriorly along the abdominal   wall and a preperitoneal plane developed.  This was carried medially  down to   the pubis and laterally to accommodate mesh.  The patient had an obvious   indirect inguinal hernia.  Hernia sac was carefully dissected away from the   cord structures and reduced further posteriorly to accommodate mesh.  A large   right-sided Bard 3DMax mesh was then inserted and placed in the center of the   mesh over the internal ring.  The peritoneum was then tacked back to itself   with the secure strap absorbable tacking device.  Inspection of the left side   showed no evidence of hernia.  The peritoneum was then deflated maintaining   the hernia sac and peritoneum flush against the mesh to prevent any folding.    The trocars were withdrawn.  The umbilical fascia closed with 0 Vicryl and   skin incisions with 4-0 Vicryl subcuticular sutures.  Sterile dressings were   applied.  The patient returned to the recovery room in stable condition.       ____________________________________     JOHN H. GANSER, MD    [facility] / [facility]    DD:  08/30/2019 14:48:27  DT:  08/30/2019 15:00:53    D#:  [id number]  Job#:  [id number]    cc: CARTER JIANG MD, Carlos Mirza DO

## 2019-08-30 NOTE — ANESTHESIA POSTPROCEDURE EVALUATION
Patient: Fernandez Magallanes    Procedure Summary     Date:  08/30/19 Room / Location:   OR 01 / SURGERY [facility]    Anesthesia Start:  1346 Anesthesia Stop:  1453    Procedure:  REPAIR, HERNIA, INGUINAL, LAPAROSCOPIC (Right ) Diagnosis:  (HERNIA INGUINAL RIGHT)    Surgeon:  John H Ganser, M.D. Responsible Provider:  Scotty Schwartz M.D.    Anesthesia Type:  general ASA Status:  3          Final Anesthesia Type: general  Last vitals  BP   NIBP: 140/96    Temp   36.3 °C (97.3 °F)    Pulse       Resp   18    SpO2   93 %      Anesthesia Post Evaluation    Patient location during evaluation: PACU  Patient participation: complete - patient participated  Level of consciousness: awake and alert    Airway patency: patent  Anesthetic complications: no  Cardiovascular status: hemodynamically stable  Respiratory status: acceptable  Hydration status: euvolemic    PONV: none           Nurse Pain Score: 0 (NPRS)

## 2019-08-30 NOTE — ANESTHESIA TIME REPORT
Anesthesia Start and Stop Event Times     Date Time Event    8/30/2019 1337 Ready for Procedure     1346 Anesthesia Start     1501 Anesthesia Stop        Responsible Staff  08/30/19    Name Role Begin End    Scotty Schwartz M.D. Anesth 1346 1501        Preop Diagnosis (Free Text):  Pre-op Diagnosis     HERNIA INGUINAL RIGHT        Preop Diagnosis (Codes):    Post op Diagnosis  Inguinal hernia      Premium Reason  A. 3PM - 7AM    Comments:

## 2022-07-08 ENCOUNTER — APPOINTMENT (OUTPATIENT)
Dept: RADIOLOGY | Facility: MEDICAL CENTER | Age: 47
End: 2022-07-08
Attending: EMERGENCY MEDICINE
Payer: MEDICAID

## 2022-07-08 ENCOUNTER — HOSPITAL ENCOUNTER (EMERGENCY)
Facility: MEDICAL CENTER | Age: 47
End: 2022-07-08
Attending: EMERGENCY MEDICINE
Payer: MEDICAID

## 2022-07-08 VITALS
RESPIRATION RATE: 18 BRPM | WEIGHT: 315 LBS | HEART RATE: 89 BPM | HEIGHT: 68 IN | SYSTOLIC BLOOD PRESSURE: 149 MMHG | BODY MASS INDEX: 47.74 KG/M2 | OXYGEN SATURATION: 95 % | DIASTOLIC BLOOD PRESSURE: 109 MMHG | TEMPERATURE: 97.4 F

## 2022-07-08 DIAGNOSIS — K08.89 PAIN, DENTAL: ICD-10-CM

## 2022-07-08 DIAGNOSIS — G51.0 BELL'S PALSY: ICD-10-CM

## 2022-07-08 DIAGNOSIS — H60.501 ACUTE OTITIS EXTERNA OF RIGHT EAR, UNSPECIFIED TYPE: ICD-10-CM

## 2022-07-08 PROCEDURE — U0005 INFEC AGEN DETEC AMPLI PROBE: HCPCS

## 2022-07-08 PROCEDURE — A9270 NON-COVERED ITEM OR SERVICE: HCPCS | Performed by: EMERGENCY MEDICINE

## 2022-07-08 PROCEDURE — 70450 CT HEAD/BRAIN W/O DYE: CPT

## 2022-07-08 PROCEDURE — U0003 INFECTIOUS AGENT DETECTION BY NUCLEIC ACID (DNA OR RNA); SEVERE ACUTE RESPIRATORY SYNDROME CORONAVIRUS 2 (SARS-COV-2) (CORONAVIRUS DISEASE [COVID-19]), AMPLIFIED PROBE TECHNIQUE, MAKING USE OF HIGH THROUGHPUT TECHNOLOGIES AS DESCRIBED BY CMS-2020-01-R: HCPCS

## 2022-07-08 PROCEDURE — 700111 HCHG RX REV CODE 636 W/ 250 OVERRIDE (IP): Performed by: EMERGENCY MEDICINE

## 2022-07-08 PROCEDURE — 700117 HCHG RX CONTRAST REV CODE 255: Performed by: EMERGENCY MEDICINE

## 2022-07-08 PROCEDURE — 70487 CT MAXILLOFACIAL W/DYE: CPT

## 2022-07-08 PROCEDURE — 700102 HCHG RX REV CODE 250 W/ 637 OVERRIDE(OP): Performed by: EMERGENCY MEDICINE

## 2022-07-08 PROCEDURE — 99283 EMERGENCY DEPT VISIT LOW MDM: CPT

## 2022-07-08 RX ORDER — VALACYCLOVIR HYDROCHLORIDE 500 MG/1
1000 TABLET, FILM COATED ORAL ONCE
Status: COMPLETED | OUTPATIENT
Start: 2022-07-08 | End: 2022-07-08

## 2022-07-08 RX ORDER — CIPROFLOXACIN/HYDROCORTISONE 0.2 %-1 %
3 SUSPENSION, DROPS(FINAL DOSAGE FORM)(ML) OTIC (EAR) 2 TIMES DAILY
Qty: 5 ML | Refills: 0 | Status: SHIPPED | OUTPATIENT
Start: 2022-07-08 | End: 2022-07-08 | Stop reason: SDUPTHER

## 2022-07-08 RX ORDER — CIPROFLOXACIN/HYDROCORTISONE 0.2 %-1 %
3 SUSPENSION, DROPS(FINAL DOSAGE FORM)(ML) OTIC (EAR) 2 TIMES DAILY
Qty: 5 ML | Refills: 0 | Status: SHIPPED | OUTPATIENT
Start: 2022-07-08 | End: 2022-07-15

## 2022-07-08 RX ORDER — VALACYCLOVIR HYDROCHLORIDE 1 G/1
1000 TABLET, FILM COATED ORAL 3 TIMES DAILY
Qty: 21 TABLET | Refills: 0 | Status: SHIPPED | OUTPATIENT
Start: 2022-07-08 | End: 2022-07-08 | Stop reason: SDUPTHER

## 2022-07-08 RX ORDER — PREDNISONE 20 MG/1
60 TABLET ORAL DAILY
Qty: 15 TABLET | Refills: 0 | Status: SHIPPED | OUTPATIENT
Start: 2022-07-08 | End: 2022-07-13

## 2022-07-08 RX ORDER — PREDNISONE 10 MG/1
TABLET ORAL
Qty: 15 TABLET | Refills: 0 | Status: SHIPPED | OUTPATIENT
Start: 2022-07-14 | End: 2022-07-19

## 2022-07-08 RX ORDER — PREDNISONE 20 MG/1
60 TABLET ORAL DAILY
Qty: 15 TABLET | Refills: 0 | Status: SHIPPED | OUTPATIENT
Start: 2022-07-08 | End: 2022-07-08 | Stop reason: SDUPTHER

## 2022-07-08 RX ORDER — VALACYCLOVIR HYDROCHLORIDE 1 G/1
1000 TABLET, FILM COATED ORAL 3 TIMES DAILY
Qty: 21 TABLET | Refills: 0 | Status: SHIPPED | OUTPATIENT
Start: 2022-07-08 | End: 2022-07-15

## 2022-07-08 RX ORDER — PREDNISONE 10 MG/1
TABLET ORAL
Qty: 15 TABLET | Refills: 0 | Status: SHIPPED | OUTPATIENT
Start: 2022-07-14 | End: 2022-07-08 | Stop reason: SDUPTHER

## 2022-07-08 RX ADMIN — PREDNISONE 60 MG: 50 TABLET ORAL at 18:50

## 2022-07-08 RX ADMIN — IOHEXOL 80 ML: 350 INJECTION, SOLUTION INTRAVENOUS at 17:57

## 2022-07-08 RX ADMIN — VALACYCLOVIR HYDROCHLORIDE 1000 MG: 500 TABLET, FILM COATED ORAL at 18:49

## 2022-07-08 ASSESSMENT — PAIN DESCRIPTION - PAIN TYPE: TYPE: ACUTE PAIN

## 2022-07-09 LAB
SARS-COV-2 RNA RESP QL NAA+PROBE: NOTDETECTED
SPECIMEN SOURCE: NORMAL

## 2022-07-09 NOTE — ED PROVIDER NOTES
ED Provider Note    ED Provider Note    Scribed for Danilo Schultz MD by Danilo Schultz M.D.. 7/8/2022, 5:28 PM.    Primary care provider: Carlos Mirza D.O.  Means of arrival: Pt  History obtained from: Private  History limited by: none    CHIEF COMPLAINT  Chief Complaint   Patient presents with   • Numbness     RT face Associated with paralysis RT face muscles    Awoke with s/s yesterday am  Pt reports recent dental/throat infection  No similar past hx  Denies ext weakness or numbness       HPI  Fernandez Magallanes is a 47 y.o. male who presents to the Emergency Department for evaluation of facial droop to the right side.  Patient notes this started 2 days ago.  Before these symptoms he noted discomfort to teeth on the left and describes what sounds like lymphadenopathy on the right below his chin and jaw.  Preceding discomfort initially quite severe but not quite mild , Resolved when he noted the weakness to the right side of his face 2 days ago.  No head trauma, he is never had any similar such symptoms.  He is unable to close his right eye but notes no vision change otherwise.  No numbness or weakness in the arms or legs, no change in speech.    REVIEW OF SYSTEMS  Pertinent positives include right-sided facial droop including the forehead, preceding dental pain and lymphadenopathy. Pertinent negatives include no involvement of the extremities.  All other systems reviewed and negative.    PAST MEDICAL HISTORY   has a past medical history of Congestive heart failure (HCC), Hypertension, and Pain.    SURGICAL HISTORY   has a past surgical history that includes knee replacement, total (Left); knee replacement, total (Right); and inguinal hernia laparoscopic (Right, 8/30/2019).    SOCIAL HISTORY  Social History     Tobacco Use   • Smoking status: Former Smoker     Packs/day: 1.00     Years: 22.00     Pack years: 22.00     Types: Cigarettes   • Smokeless tobacco: Never Used   Substance Use  "Topics   • Alcohol use: No   • Drug use: No      Social History     Substance and Sexual Activity   Drug Use No       FAMILY HISTORY  Stroke in grandfather    CURRENT MEDICATIONS  Home Medications    **Home medications have not yet been reviewed for this encounter**         ALLERGIES  No Known Allergies    PHYSICAL EXAM  VITAL SIGNS: BP (!) 140/103   Pulse (!) 106   Temp 36.1 °C (96.9 °F) (Temporal)   Resp 18   Ht 1.727 m (5' 8\")   Wt (!) 176 kg (388 lb 14.3 oz)   SpO2 94%   BMI 59.13 kg/m²     General: Alert, no acute distress  Skin: Warm, dry, normal for ethnicity  Head: Normocephalic, atraumatic  Neck: Trachea midline, no tenderness  Eye: PERRL, normal conjunctiva, extraocular movements intact.  ENMT: Oral mucosa moist, no pharyngeal erythema or exudate.  Evidence of otitis externa with induration and erythema to the external auditory canal on the right.  Cardiovascular: Regular rate and rhythm, No murmur, Normal peripheral perfusion  Respiratory: Lungs CTA, respirations are non-labored, breath sounds are equal  Gastrointestinal: Soft, nontender, non distended  Musculoskeletal: No swelling, no deformity  Neurological: Alert and oriented to person, place, time, and situation.  cranial nerves II through VI and VIII through XII are grossly intact.  Patient has facial droop involving the entirety of the right side of the face including the forehead with sensation to to light touch fully intact.  Unable to close the right eyelid.  Lymphatics: No acute cervical lymphadenopathy  Psychiatric: Cooperative, appropriate mood & affect    RADIOLOGY  CT-MAXILLOFACIAL WITH PLUS RECONS   Final Result      1.  Multiple periapical lucencies in the maxillary and mandibular teeth, predominantly involving the right maxillary teeth. No subperiosteal abscess.   2.  Mild bilateral cervical lymphadenopathy, statistically reactive. It can be followed clinically.      CT-HEAD W/O   Final Result      No CT evidence of acute infarct, " "hemorrhage or mass.           The radiologist's interpretation of all radiological studies have been reviewed by me.    COURSE & MEDICAL DECISION MAKING  Pertinent Labs & Imaging studies reviewed. (See chart for details)    5:28 PM - Patient seen and examined at bedside. Patient will be treated with prednisone 60 mg p.o. Ordered CT head and maxillofacial CT with IV contrast to evaluate his symptoms. The differential diagnoses include but are not limited to: Bell's palsy, intracranial hemorrhage, facial abscess    1840: Patient reassessed and remains in no acute distress, relieved to hear of unremarkable studies.  I have ordered valacyclovir for him.    Patient Vitals for the past 24 hrs:   BP Temp Temp src Pulse Resp SpO2 Height Weight   07/08/22 1633 (!) 140/103 36.1 °C (96.9 °F) Temporal (!) 106 18 94 % 1.727 m (5' 8\") (!) 176 kg (388 lb 14.3 oz)     HTN/IDDM FOLLOW UP:  The patient is referred to a primary physician for blood pressure management, diabetic screening, and for all other preventive health concerns    Decision Making:  This is a 47 y.o. year old male who presents with evidence of Bell's palsy.  He has no neurologic deficits other than isolated 7th cranial nerve lesion including the forehead.  Sensation of the face is fully intact.  He has no pronator drift nor any weakness or numbness to the extremities.  Given however the started with severe dental pain with lymphadenopathy I am concerned for possible dental abscess and as such CT imaging will be obtained to evaluate further.  CT the brain is thankfully unremarkable with no evidence of hemorrhage or CVA.  Will initiate steroids and antivirals given severity of his symptoms.  Recommend he tape his eyelid shut at night as he is having difficulty closing it.    The patient will return for new or worsening symptoms and is stable at the time of discharge.    Patient has had high blood pressure while in the emergency department, felt likely secondary to " medical condition. Counseled patient to monitor blood pressure at home and follow up with primary care physician.      DISPOSITION:  Patient will be discharged home in stable condition.    FOLLOW UP:  Carlos Mirza D.O.  [address]  [place] [id number] [phone]    Schedule an appointment as soon as possible for a visit         OUTPATIENT MEDICATIONS:  New Prescriptions    CIPROFLOXACIN (CIPRO HC) 0.2-1 % SUSPENSION    Administer 3 Drops into the right ear 2 times a day for 7 days. Administer drops to both ears.    PREDNISONE (DELTASONE) 10 MG TAB    Take 5 Tablets by mouth every day for 1 day, THEN 4 Tablets every day for 1 day, THEN 3 Tablets every day for 1 day, THEN 2 Tablets every day for 1 day, THEN 1 Tablet every day for 1 day.    PREDNISONE (DELTASONE) 20 MG TAB    Take 3 Tablets by mouth every day for 5 days.    VALACYCLOVIR (VALTREX) 1 GM TAB    Take 1 Tablet by mouth 3 times a day for 7 days.         FINAL IMPRESSION  1. Bell's palsy    2. Acute otitis externa of right ear, unspecified type    3. Pain, dental          IDanilo M.D. (Scribe), am scribing for, and in the presence of, Danlio Schultz MD.    Electronically signed by: Danilo Schultz M.D. (Scribe), 7/8/2022    IDanilo MD personally performed the services described in this documentation, as scribed by Danilo Schultz M.D. in my presence, and it is both accurate and complete    The note accurately reflects work and decisions made by me.  Danilo Schultz M.D.  7/8/2022  6:45 PM

## 2022-07-10 RX ORDER — NEOMYCIN SULFATE, POLYMYXIN B SULFATE AND HYDROCORTISONE 10; 3.5; 1 MG/ML; MG/ML; [USP'U]/ML
3 SUSPENSION/ DROPS AURICULAR (OTIC) 3 TIMES DAILY
Qty: 10 ML | Refills: 0 | Status: SHIPPED | OUTPATIENT
Start: 2022-07-10 | End: 2022-07-21

## 2022-07-20 ENCOUNTER — HOSPITAL ENCOUNTER (EMERGENCY)
Facility: MEDICAL CENTER | Age: 47
End: 2022-07-20
Attending: EMERGENCY MEDICINE
Payer: MEDICAID

## 2022-07-20 VITALS
BODY MASS INDEX: 36.45 KG/M2 | HEIGHT: 78 IN | OXYGEN SATURATION: 93 % | TEMPERATURE: 96.3 F | WEIGHT: 315 LBS | HEART RATE: 96 BPM | DIASTOLIC BLOOD PRESSURE: 95 MMHG | SYSTOLIC BLOOD PRESSURE: 127 MMHG | RESPIRATION RATE: 16 BRPM

## 2022-07-20 DIAGNOSIS — K04.7 INFECTED DENTAL CARIES: ICD-10-CM

## 2022-07-20 DIAGNOSIS — K02.9 INFECTED DENTAL CARIES: ICD-10-CM

## 2022-07-20 DIAGNOSIS — B02.21 RAMSAY HUNT SYNDROME (GENICULATE HERPES ZOSTER): ICD-10-CM

## 2022-07-20 DIAGNOSIS — L08.9 PUSTULE: ICD-10-CM

## 2022-07-20 PROCEDURE — 99282 EMERGENCY DEPT VISIT SF MDM: CPT

## 2022-07-20 RX ORDER — AMOXICILLIN 500 MG/1
500 TABLET, FILM COATED ORAL 3 TIMES DAILY
Qty: 21 TABLET | Refills: 0 | Status: SHIPPED | OUTPATIENT
Start: 2022-07-20 | End: 2022-07-27

## 2022-07-20 RX ORDER — HYDROCODONE BITARTRATE AND ACETAMINOPHEN 5; 325 MG/1; MG/1
1-2 TABLET ORAL EVERY 6 HOURS PRN
Qty: 20 TABLET | Refills: 0 | Status: SHIPPED | OUTPATIENT
Start: 2022-07-20 | End: 2022-07-25

## 2022-07-20 RX ORDER — SULFAMETHOXAZOLE AND TRIMETHOPRIM 800; 160 MG/1; MG/1
1 TABLET ORAL 2 TIMES DAILY
Qty: 14 TABLET | Refills: 0 | Status: SHIPPED | OUTPATIENT
Start: 2022-07-20 | End: 2022-07-27

## 2022-07-20 NOTE — ED TRIAGE NOTES
"Chief Complaint   Patient presents with   • Earache     R ear pain radiating to forehead and jaw. Was diagnosed with a viral ear infection and has finished meds but pain is worsening. States he took steroids, valtrex and abx drops.      Ht 2.032 m (6' 8\")   Wt (!) 174 kg (383 lb 13.1 oz)   BMI 42.16 kg/m²   135/96, 108, 95% RA  "

## 2022-07-21 NOTE — ED PROVIDER NOTES
ED Provider Note    CHIEF COMPLAINT  Chief Complaint   Patient presents with   • Earache     R ear pain radiating to forehead and jaw. Was diagnosed with a viral ear infection and has finished meds but pain is worsening. States he took steroids, valtrex and abx drops.        HPI  Fernandez Magallanes is a 47 y.o. male who presents with complaint of right ear pain, right facial weakness.  He was diagnosed with Bell's palsy 12 days ago, placed on Valtrex, prednisone, and antibiotic eardrops.  At the time CT scan and lab work were obtained, unremarkable.  Patient states he is finished his medications, pain is worsening.  He is requesting prescription for pain medicine.  Weakness to his right face and continues.  He uses eyedrops to help protect his eye, states at night it is not a problem closing his eye.  Pain in his right ear radiates to his right cheek and right jaw.  No ear drainage.  No diplopia or visual change    Second complaint is worsening left lower dental pain.  CT scan of the face last week showed multiple periapical lucencies.        REVIEW OF SYSTEMS  Constitutional: No fever  Respiratory: No cough or shortness of breath  ENT right ear pain, dental pain  Neurologic: Right facial weakness  Gastrointestinal: No abdominal pain  Musculoskeletal: No back pain    PAST MEDICAL HISTORY  Past Medical History:   Diagnosis Date   • Congestive heart failure (HCC)    • Hypertension    • Pain     abdominal pain from hernia       FAMILY HISTORY  No family history on file.    SOCIAL HISTORY  Social History     Socioeconomic History   • Marital status:    Tobacco Use   • Smoking status: Former Smoker     Packs/day: 1.00     Years: 22.00     Pack years: 22.00     Types: Cigarettes   • Smokeless tobacco: Never Used   Substance and Sexual Activity   • Alcohol use: No   • Drug use: No       SURGICAL HISTORY  Past Surgical History:   Procedure Laterality Date   • INGUINAL HERNIA LAPAROSCOPIC Right 8/30/2019     "Procedure: REPAIR, HERNIA, INGUINAL, LAPAROSCOPIC;  Surgeon: John H Ganser, M.D.;  Location: SURGERY [facility];  Service: General   • KNEE REPLACEMENT, TOTAL Left    • KNEE REPLACEMENT, TOTAL Right        CURRENT MEDICATIONS  No current facility-administered medications on file prior to encounter.     Current Outpatient Medications on File Prior to Encounter   Medication Sig Dispense Refill   • neomycin-polymyxin-HC (PEDIOTIC HC) [id number] Suspension Administer 3 Drops into affected ear(s) 3 times a day for 11 days. 10 mL 0   • carvedilol (COREG) 6.25 MG Tab Take 6.25 mg by mouth every day at 6 PM.         ALLERGIES  No Known Allergies    PHYSICAL EXAM  VITAL SIGNS: BP (!) 135/96   Pulse (!) 112   Temp 37.3 °C (99.1 °F) (Oral)   Resp 19   Ht 2.032 m (6' 8\")   Wt (!) 174 kg (383 lb 13.1 oz)   SpO2 95%   BMI 42.16 kg/m²   Constitutional:  Well nourished, No acute distress.   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  Patient with multiple dental caries, left lower dentition shows inflamed adjacent gingiva, no purulence  Lymphatics: No adenopathy  Eyes:  Conjunctiva normal, No discharge.    Cardiovascular: The heart is regular rhythm, normal rate  Pulmonary: Lungs clear  Skin: No cyanosis.  Right ear canal with vesicles and pustules, no external rash  Musculoskeletal: Neck nontender   Neurologic: speech is clear, no ataxia.  Right-sided Bell's palsy  Psychiatric:  Mood normal.  Cooperative      COURSE & MEDICAL DECISION MAKING  Pertinent Labs & Imaging studies reviewed. 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  Given evidence of several purulent lesions in the right ear, concerned about secondary bacterial infection in the setting of shingles, placed on amoxicillin and Bactrim for this, this should also cover his infected dental caries.  He is " advised to see a dentist as soon as possible.  He is advised follow-up his primary doctor if not better in 2 weeks and to go to [facility] should symptoms worsen, was explained to him that we do not have access to ear nose throat specialist at this hospital.     FINAL IMPRESSION     1. [place] Soto syndrome (geniculate herpes zoster)  HYDROcodone-acetaminophen (NORCO) 5-325 MG Tab per tablet   2. Infected dental caries  Amoxicillin 500 MG Tab    HYDROcodone-acetaminophen (NORCO) 5-325 MG Tab per tablet   3. Pustule  Amoxicillin 500 MG Tab    sulfamethoxazole-trimethoprim (BACTRIM DS) 800-160 MG tablet                Electronically signed by: Justin Rai M.D., 7/20/2022 6:06 PM

## 2022-07-21 NOTE — ED NOTES
Pt cleared for d/c  Aware that Rx's norco, septra and amoxicillin were sent to listed pharmacy  He is to  and take as directed  D/c'ed to home in NAD

## 2022-07-21 NOTE — DISCHARGE PLANNING
Anticipated Discharge Disposition: Home    Action: ER CM called pharmacy to follow up on prior auth request sent by Pharmacy this am and worked on by Coworker Rhoda. Pharmacist advised that they had the RX changed to capsules vs tablets and it is covered and no longer needs a prior auth and this was done yesterday and sold yesterday. Will update Rhoda.     Barriers to Discharge: None    Plan: No further needs

## 2022-07-21 NOTE — DISCHARGE INSTRUCTIONS
See a doctor for recheck if no improvement in 2 weeks.  If worse go to [facility] emergency department.

## 2022-08-23 ENCOUNTER — OFFICE VISIT (OUTPATIENT)
Dept: MEDICAL GROUP | Facility: CLINIC | Age: 47
End: 2022-08-23
Payer: MEDICAID

## 2022-08-23 VITALS
HEIGHT: 78 IN | OXYGEN SATURATION: 95 % | TEMPERATURE: 96.7 F | WEIGHT: 315 LBS | RESPIRATION RATE: 20 BRPM | DIASTOLIC BLOOD PRESSURE: 92 MMHG | SYSTOLIC BLOOD PRESSURE: 131 MMHG | BODY MASS INDEX: 36.45 KG/M2 | HEART RATE: 71 BPM

## 2022-08-23 DIAGNOSIS — B02.21 RAMSAY HUNT SYNDROME (GENICULATE HERPES ZOSTER): ICD-10-CM

## 2022-08-23 PROCEDURE — 99214 OFFICE O/P EST MOD 30 MIN: CPT | Mod: GE | Performed by: STUDENT IN AN ORGANIZED HEALTH CARE EDUCATION/TRAINING PROGRAM

## 2022-08-23 RX ORDER — HYDROCODONE BITARTRATE AND ACETAMINOPHEN 5; 325 MG/1; MG/1
1 TABLET ORAL EVERY 8 HOURS PRN
Qty: 10 TABLET | Refills: 0 | Status: SHIPPED | OUTPATIENT
Start: 2022-08-23 | End: 2022-09-02

## 2022-08-23 RX ORDER — GABAPENTIN 300 MG/1
300 CAPSULE ORAL
Qty: 90 CAPSULE | Refills: 0 | Status: SHIPPED | OUTPATIENT
Start: 2022-08-23 | End: 2022-11-17 | Stop reason: SDUPTHER

## 2022-08-23 RX ORDER — PREDNISONE 20 MG/1
40 TABLET ORAL DAILY
Qty: 10 TABLET | Refills: 0 | Status: SHIPPED | OUTPATIENT
Start: 2022-08-23 | End: 2022-08-28

## 2022-08-23 ASSESSMENT — PATIENT HEALTH QUESTIONNAIRE - PHQ9: CLINICAL INTERPRETATION OF PHQ2 SCORE: 0

## 2022-08-24 NOTE — PROGRESS NOTES
"Subjective:     CC: right sided facial droop    HPI:   Fernandez presents today with ED follow-up.     7 week history of right sided facial droop. He has been to ED twice. 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 Given course of acyclovir, course of prednisone, hydrocodone. Was treated with antibiotics for pustules in ear canal.     He continues to have right sided facial symptoms. Difficulty with blinking on the right eye. Unable to close lips completely on the right side. Cannot raise his eyebrow on the right. He does have some blurry vision on the right. No hearing changes.     He is taking 10 ibuprofen 250 mg at night for pain control. He notes that the hydrocodone were helpful.     Problem   Carmelita Soto Syndrome (Geniculate Herpes Zoster)       Current Outpatient Medications Ordered in Epic   Medication Sig Dispense Refill    predniSONE (DELTASONE) 20 MG Tab Take 2 Tablets by mouth every day for 5 days. 10 Tablet 0    HYDROcodone-acetaminophen (NORCO) 5-325 MG Tab per tablet Take 1 Tablet by mouth every 8 hours as needed (pain) for up to 10 days. 10 Tablet 0    gabapentin (NEURONTIN) 300 MG Cap Take 1 Capsule by mouth at bedtime as needed (pain). 90 Capsule 0    carvedilol (COREG) 6.25 MG Tab Take 6.25 mg by mouth every day at 6 PM.       No current Epic-ordered facility-administered medications on file.     ROS:  Gen: no fevers/chills  Eyes: yes right sided blurry vision  ENT: no hearing changes  Pulm: no cough  CV: no chest pain  GI: no nausea/vomiting  Skin: no rash  Neuro: yes facial weakness      Objective:     Exam:  BP (!) 131/92   Pulse 71   Temp 35.9 °C (96.7 °F) (Temporal)   Resp 20   Ht 2.032 m (6' 8\")   Wt (!) 179 kg (394 lb)   SpO2 95%   BMI 43.28 kg/m²  Body mass index is 43.28 kg/m².    Gen: Alert and oriented, No apparent distress.  Heent: EOMI, no conjunctival injection, bilateral Tms without bulging or erythema  Lungs: Normal effort, CTA bilaterally, no wheezes  CV: Regular rate and rhythm. No " murmur  Neuro: decreased sensation to touch overlying right mandible, asymmetry with facial muscle movements (unable to raise right eyebrow, unable to smile on right side)    Assessment & Plan:     47 y.o. male with the following -     Problem List Items Addressed This Visit       Carmelita Hunt syndrome (geniculate herpes zoster)    Relevant Medications    predniSONE (DELTASONE) 20 MG Tab    HYDROcodone-acetaminophen (NORCO) 5-325 MG Tab per tablet    gabapentin (NEURONTIN) 300 MG Cap    Other Relevant Orders    Controlled Substance Treatment Agreement    Referral to Neurology     Mr. Magallanes has been dealing with what appears to be Carmelita Hunt syndrome for 7 weeks. He has been to the ED twice. He has completed course of acyclovir and prednisone. He was given course of antibiotics for pustules in ear canal and these have improved. He is still dealing with right sided facial weakness and pain.     We will attempt another course of prednisone. Plan for 5 days of prednisone 40 mg daily. We will start gabapentin QHS PRN to try and cut down on amount of NSAIDs he is taking. We will give a short course of hydrocodone,  reviewed and controlled substance contract completed.     Discussed with patient that this can take months to improve. We will place referral to Neurology to see if other recommendations.

## 2022-10-07 ENCOUNTER — OFFICE VISIT (OUTPATIENT)
Dept: MEDICAL GROUP | Facility: CLINIC | Age: 47
End: 2022-10-07
Payer: MEDICAID

## 2022-10-07 VITALS
WEIGHT: 315 LBS | SYSTOLIC BLOOD PRESSURE: 120 MMHG | RESPIRATION RATE: 16 BRPM | BODY MASS INDEX: 36.45 KG/M2 | HEART RATE: 78 BPM | OXYGEN SATURATION: 99 % | DIASTOLIC BLOOD PRESSURE: 80 MMHG | TEMPERATURE: 97.6 F | HEIGHT: 78 IN

## 2022-10-07 DIAGNOSIS — B02.21 RAMSAY HUNT SYNDROME (GENICULATE HERPES ZOSTER): ICD-10-CM

## 2022-10-07 DIAGNOSIS — M25.561 CHRONIC PAIN OF RIGHT KNEE: ICD-10-CM

## 2022-10-07 DIAGNOSIS — H91.91 HEARING LOSS OF RIGHT EAR, UNSPECIFIED HEARING LOSS TYPE: ICD-10-CM

## 2022-10-07 DIAGNOSIS — H61.21 IMPACTED CERUMEN OF RIGHT EAR: ICD-10-CM

## 2022-10-07 DIAGNOSIS — G89.29 CHRONIC PAIN OF RIGHT KNEE: ICD-10-CM

## 2022-10-07 PROCEDURE — 99213 OFFICE O/P EST LOW 20 MIN: CPT | Mod: GE | Performed by: STUDENT IN AN ORGANIZED HEALTH CARE EDUCATION/TRAINING PROGRAM

## 2022-10-07 RX ORDER — IBUPROFEN 800 MG/1
800 TABLET ORAL EVERY 8 HOURS PRN
Qty: 90 TABLET | Refills: 3 | Status: SHIPPED | OUTPATIENT
Start: 2022-10-07 | End: 2022-11-17 | Stop reason: SDUPTHER

## 2022-10-08 NOTE — PROGRESS NOTES
"Subjective:     CC: multiple concerns    HPI:   Fernandez presents today with multiple concerns    History of Carmelita Soto. Was previously seen and was having right sided facial droop as well as pain on right side of face. Had difficulty closing right eye. He completed course of acyclovir and prednisone. He completed antibiotics for pustules in right ear. He notes that pain has resolved. He is still dealing with right sided facial droop. He is seeing Neurology. He has had some intermittent loss of hearing in right ear. He gets ears cleaned out once per year.     Right knee pain. History of surgery on right knee. Interested in PT. Ibuprofen helpful.       No problems updated.    Current Outpatient Medications Ordered in Epic   Medication Sig Dispense Refill    carbamide peroxide (DEBROX) 6.5 % Solution Administer 6 Drops into affected ear(s) 2 times a day. Administer drops in both ears. 15 mL 0    ibuprofen (MOTRIN) 800 MG Tab Take 1 Tablet by mouth every 8 hours as needed for Mild Pain, Moderate Pain, Fever, Headache or Inflammation. 90 Tablet 3    gabapentin (NEURONTIN) 300 MG Cap Take 1 Capsule by mouth at bedtime as needed (pain). 90 Capsule 0    carvedilol (COREG) 6.25 MG Tab Take 6.25 mg by mouth every day at 6 PM.       No current Epic-ordered facility-administered medications on file.         ROS:  Gen: no fevers/chills  ENT: yes right sided facial weakness  Pulm: no sob, no cough  CV: no chest pain, no palpitations  GI: no nausea/vomiting, no diarrhea  : no dysuria  MSk: yes myalgias  Skin: no rash      Objective:     Exam:  /80 (BP Location: Left arm, Patient Position: Sitting, BP Cuff Size: Large adult)   Pulse 78   Temp 36.4 °C (97.6 °F) (Temporal)   Resp 16   Ht 2.032 m (6' 8\")   Wt (!) 181 kg (400 lb)   SpO2 99%   BMI 43.94 kg/m²  Body mass index is 43.94 kg/m².    Gen: Alert and oriented, No apparent distress.  Heent: EOMI, PERRLA, no conjunctival injection, bilateral Tms without bulging or " erythema  Lungs: Normal effort, CTA bilaterally, no wheezes  CV: Regular rate and rhythm. No murmurs  Neuro: equal sensation to touch bilateral on both sides of face (improved from previous exam), right sided facial droop    Assessment & Plan:     47 y.o. male with the following -     Problem List Items Addressed This Visit    None  Visit Diagnoses       BMI 40.0-44.9, adult (HCC)        Relevant Orders    Basic Metabolic Panel    Chronic pain of right knee        Relevant Medications    ibuprofen (MOTRIN) 800 MG Tab    Other Relevant Orders    Referral to Orthopedics    Referral to Physical Therapy    Hearing loss of right ear, unspecified hearing loss type        Relevant Orders    Referral to Audiology    Impacted cerumen of right ear        Relevant Medications    carbamide peroxide (DEBROX) 6.5 % Solution          Fernandez is still dealing with significant right sided facial droop and weakness, likely due to [place] Hunt syndrome. This all started in early July, so it has now been about 3 months. He has completed acyclovir and two courses of steroids. He has utilized hydrocodone and ibuprofen for pain. He completed course of antibiotics for pustules in right ear. He is established with Neurology. Today, he is inching along the path towards improvement. He has intact sensation to touch over the right side of his face while on previous exam he did not. He is still dealing with significant weakness on the right side of face but he is able to close his right eye a little more. His pain has resolved which is good. Given that this is likely due to Carmelita Soto and he is showing improvement we will not intervene at this time. He does not complain of any more facial pain that would necessitate stronger pain medication. He notes that the steroids did not help. We will await to see if Neurology has any recommendations.     In the meantime, he is dealing with significant right knee pain. He has a history of right knee  surgery, completed years ago at the [facility]. He notes recent increased swelling in his right knee. We will send for PT and have him touch base with [facility]. He has been utilizing a lot of ibuprofen for knee pain which is helpful but we will check kidney function to ensure it is holding up.     Intermittent hearing loss in right ear could be due to cerumen impaction (he has history of), but could be involvement of Carmelita Soto. Recommend course of Debrox and will send to Audiology for hearing evaluation.     Follow-up in 3 months

## 2022-11-03 ENCOUNTER — OFFICE VISIT (OUTPATIENT)
Dept: MEDICAL GROUP | Facility: CLINIC | Age: 47
End: 2022-11-03
Payer: MEDICAID

## 2022-11-03 VITALS
HEIGHT: 78 IN | WEIGHT: 315 LBS | BODY MASS INDEX: 36.45 KG/M2 | TEMPERATURE: 99.1 F | DIASTOLIC BLOOD PRESSURE: 90 MMHG | OXYGEN SATURATION: 95 % | SYSTOLIC BLOOD PRESSURE: 139 MMHG | RESPIRATION RATE: 20 BRPM | HEART RATE: 85 BPM

## 2022-11-03 DIAGNOSIS — M25.561 CHRONIC PAIN OF RIGHT KNEE: ICD-10-CM

## 2022-11-03 DIAGNOSIS — G89.29 CHRONIC PAIN OF RIGHT KNEE: ICD-10-CM

## 2022-11-03 PROCEDURE — 99213 OFFICE O/P EST LOW 20 MIN: CPT | Mod: GE | Performed by: STUDENT IN AN ORGANIZED HEALTH CARE EDUCATION/TRAINING PROGRAM

## 2022-11-03 NOTE — PROGRESS NOTES
"Subjective:     CC: knee pain    HPI:   Fernandez presents today with bilateral knee pain, worse in right. He has a history of surgery on both knees when he was in about high school, secondary to a football injury. He thinks he might have had ACL repair but he is not sure. Now his right and left knees have been getting more painful over the past few weeks. He notes pain with ambulation. No unstable feeling. He gets swelling in the knees. No new injury or excessive use.    He has a history of pulmonary hypertension and follows with Cardiology. He notes this causes lower extremity swelling.     He is recovering from [place] Hunt Syndrome. Still with some right sided facial weakness, but improving.     No problems updated.    Current Outpatient Medications Ordered in Epic   Medication Sig Dispense Refill    ibuprofen (MOTRIN) 800 MG Tab Take 1 Tablet by mouth every 8 hours as needed for Mild Pain, Moderate Pain, Fever, Headache or Inflammation. 90 Tablet 3    gabapentin (NEURONTIN) 300 MG Cap Take 1 Capsule by mouth at bedtime as needed (pain). 90 Capsule 0    carvedilol (COREG) 6.25 MG Tab Take 6.25 mg by mouth every day at 6 PM.      carbamide peroxide (DEBROX) 6.5 % Solution Administer 6 Drops into affected ear(s) 2 times a day. Administer drops in both ears. (Patient not taking: Reported on 11/3/2022) 15 mL 0     No current Epic-ordered facility-administered medications on file.     ROS:  Gen: no fevers/chills  Eyes: no changes in vision  ENT: no ear pain  Pulm: no cough  CV: no chest pain  : no dysuria  MSk: yes knee pain  Skin: no rash      Objective:     Exam:  BP (!) 139/90 (BP Location: Right arm, Patient Position: Sitting, BP Cuff Size: Large adult)   Pulse 85   Temp 37.3 °C (99.1 °F) (Temporal)   Resp 20   Ht 2.032 m (6' 8\")   Wt (!) 179 kg (394 lb)   SpO2 95%   BMI 43.28 kg/m²  Body mass index is 43.28 kg/m².    Gen: Alert and oriented, No apparent distress. Pleasant.  Heent: right sided facial droop, " EOMI  Lungs: Normal effort, CTA bilaterally, no wheezes  CV: Regular rate and rhythm.  MSK: right knee with pain with valgus and varus stress, tender to palpation over the medial aspect of knee, negative anterior and posterior drawer, crepitus with range of motion  Ext: bilateral lower extremity nonpitting edema     Assessment & Plan:     47 y.o. male with the following -     Problem List Items Addressed This Visit       Chronic pain of right knee    Relevant Orders    DX-KNEE 3 VIEWS RIGHT     Bilateral knee pain, worse in right. History of surgery on both knees after suffering football injury in high school. Worsening knee pain recently with ambulation and associated swelling. Exam notable for pain with varus and valgus stress. Also with crepitus with range of motion. Could be arthritis or could be meniscal injury. Will start with x-ray of right knee and close follow-up to discuss options. He is currently set up to start PT. Ibuprofen has been helpful and recommend trial of Voltaren gel. Also recommend ice.     If x-ray concerning for OA, can consider staying the course or steroid injection. If x-ray normal then may need MR to evaluate for possible meniscus injury.

## 2022-11-08 ENCOUNTER — APPOINTMENT (OUTPATIENT)
Dept: RADIOLOGY | Facility: MEDICAL CENTER | Age: 47
End: 2022-11-08
Attending: STUDENT IN AN ORGANIZED HEALTH CARE EDUCATION/TRAINING PROGRAM
Payer: MEDICAID

## 2022-11-08 PROCEDURE — 73562 X-RAY EXAM OF KNEE 3: CPT | Mod: RT

## 2022-11-09 ENCOUNTER — APPOINTMENT (OUTPATIENT)
Dept: PHYSICAL THERAPY | Facility: MEDICAL CENTER | Age: 47
End: 2022-11-09
Attending: STUDENT IN AN ORGANIZED HEALTH CARE EDUCATION/TRAINING PROGRAM
Payer: MEDICAID

## 2022-11-14 ENCOUNTER — OFFICE VISIT (OUTPATIENT)
Dept: MEDICAL GROUP | Facility: CLINIC | Age: 47
End: 2022-11-14
Payer: MEDICAID

## 2022-11-14 VITALS
HEART RATE: 99 BPM | BODY MASS INDEX: 36.45 KG/M2 | HEIGHT: 78 IN | DIASTOLIC BLOOD PRESSURE: 99 MMHG | SYSTOLIC BLOOD PRESSURE: 145 MMHG | RESPIRATION RATE: 16 BRPM | WEIGHT: 315 LBS | OXYGEN SATURATION: 96 %

## 2022-11-14 DIAGNOSIS — M25.561 CHRONIC PAIN OF RIGHT KNEE: ICD-10-CM

## 2022-11-14 DIAGNOSIS — H91.91 HEARING LOSS OF RIGHT EAR, UNSPECIFIED HEARING LOSS TYPE: ICD-10-CM

## 2022-11-14 DIAGNOSIS — G89.29 CHRONIC PAIN OF RIGHT KNEE: ICD-10-CM

## 2022-11-14 PROCEDURE — 20610 DRAIN/INJ JOINT/BURSA W/O US: CPT | Mod: GC | Performed by: STUDENT IN AN ORGANIZED HEALTH CARE EDUCATION/TRAINING PROGRAM

## 2022-11-14 RX ORDER — METHYLPREDNISOLONE ACETATE 40 MG/ML
40 INJECTION, SUSPENSION INTRA-ARTICULAR; INTRALESIONAL; INTRAMUSCULAR; SOFT TISSUE ONCE
Status: COMPLETED | OUTPATIENT
Start: 2022-11-14 | End: 2022-11-14

## 2022-11-14 RX ORDER — LOSARTAN POTASSIUM 50 MG/1
50 TABLET ORAL DAILY
COMMUNITY
Start: 2022-08-01 | End: 2023-01-10 | Stop reason: SDUPTHER

## 2022-11-14 RX ADMIN — METHYLPREDNISOLONE ACETATE 40 MG: 40 INJECTION, SUSPENSION INTRA-ARTICULAR; INTRALESIONAL; INTRAMUSCULAR; SOFT TISSUE at 14:29

## 2022-11-14 RX ADMIN — METHYLPREDNISOLONE ACETATE 40 MG: 40 INJECTION, SUSPENSION INTRA-ARTICULAR; INTRALESIONAL; INTRAMUSCULAR; SOFT TISSUE at 14:30

## 2022-11-14 NOTE — ASSESSMENT & PLAN NOTE
Obtained consent for steroid injection, reviewed risks and benefits, patient understands and would like to proceed.  Mix 40 mg of Depo-Medrol x2 with 1 cc of 1% lidocaine, and injected into medial aspect of right knee with sterile field.  Patient tolerated procedure well.  Dr. Batres was present for entirety of procedure. Advised that he should take a week or so for the full benefit of medication.   Patient to proceed with PT next week.  Reviewed X-Ray with patient and Dr. Batres - looks like moderate osteoarthritis, worse on lateral aspect.

## 2022-11-14 NOTE — PROGRESS NOTES
"Subjective:     CC: Knee pain    HPI:   Feranndez presents today with:    Problem   Hearing Loss of Right Ear   Chronic Pain of Right Knee    Patient has suffered from knee pain bilaterally for the majority of his adult life.  The most recent debilitating pain affects his right knee and started 4 to 5 months ago.  There are times when patient cannot walk because the pain is so bad.  He has only been taking ibuprofen when it hurts, about 5-6 times per week.  He is not currently icing.  He is not currently working.  He has PT scheduled for next week.  Would like to proceed with steroid joint injection.           Current Outpatient Medications Ordered in Epic   Medication Sig Dispense Refill    losartan (COZAAR) 50 MG Tab Take 1 Tablet by mouth every day.      ibuprofen (MOTRIN) 800 MG Tab Take 1 Tablet by mouth every 8 hours as needed for Mild Pain, Moderate Pain, Fever, Headache or Inflammation. 90 Tablet 3    gabapentin (NEURONTIN) 300 MG Cap Take 1 Capsule by mouth at bedtime as needed (pain). 90 Capsule 0    carvedilol (COREG) 6.25 MG Tab Take 1 Tablet by mouth 2 times a day with meals.       No current Epic-ordered facility-administered medications on file.       Objective:     Exam:  BP (!) 145/99 (BP Location: Right arm, Patient Position: Sitting, BP Cuff Size: Large adult)   Pulse 99   Resp 16   Ht 2.032 m (6' 8\")   Wt (!) 177 kg (390 lb)   SpO2 96%   BMI 42.84 kg/m²  Body mass index is 42.84 kg/m².    General: Normal appearing. No distress.  Neurologic: Grossly nonfocal  Lymph: No cervical or supraclavicular lymph nodes are palpable  Skin: Warm and dry.  No obvious lesions.  Musculoskeletal: Swelling of right knee with crepitus      Assessment & Plan:     47 y.o. male with the following -     Problem List Items Addressed This Visit       Hearing loss of right ear    Chronic pain of right knee     Obtained consent for steroid injection, reviewed risks and benefits, patient understands and would like to " proceed.  Mix 40 mg of Depo-Medrol x2 with 1 cc of 1% lidocaine, and injected into medial aspect of right knee with sterile field.  Patient tolerated procedure well.  Dr. Batres was present for entirety of procedure. Advised that he should take a week or so for the full benefit of medication.   Patient to proceed with PT next week.  Reviewed X-Ray with patient and Dr. Batres - looks like moderate osteoarthritis, worse on lateral aspect.         Relevant Orders    Consent for all Surgical, Special Diagnostic or Therapeutic Procedures         No follow-ups on file.    Syl Hedrick MD   PGY-3

## 2022-11-14 NOTE — ASSESSMENT & PLAN NOTE
Patient signed consent for steroid injection of the knee.  Used 2 bottles of the 40 mg Depo-Medrol, mixed with 1 mL of 1% lidocaine.  Injected from the medial aspect of the knee joint pointed towards the lateral aspect.  Reviewed the x-ray with the patient as well as Dr. Batres, which showed significant osteoarthritis more laterally.  Sterilized the area and injected 3 mL total of the medicine.

## 2022-11-16 ENCOUNTER — PHYSICAL THERAPY (OUTPATIENT)
Dept: PHYSICAL THERAPY | Facility: MEDICAL CENTER | Age: 47
End: 2022-11-16
Attending: STUDENT IN AN ORGANIZED HEALTH CARE EDUCATION/TRAINING PROGRAM
Payer: MEDICAID

## 2022-11-16 DIAGNOSIS — M25.561 CHRONIC PAIN OF RIGHT KNEE: ICD-10-CM

## 2022-11-16 DIAGNOSIS — G89.29 CHRONIC PAIN OF RIGHT KNEE: ICD-10-CM

## 2022-11-16 PROCEDURE — 97162 PT EVAL MOD COMPLEX 30 MIN: CPT

## 2022-11-16 ASSESSMENT — ENCOUNTER SYMPTOMS
QUALITY: ACHING
PAIN SCALE: 1
PAIN SCALE AT HIGHEST: 5
PAIN SCALE AT LOWEST: 1

## 2022-11-16 NOTE — OP THERAPY EVALUATION
"  Outpatient Physical Therapy  INITIAL EVALUATION    [facility] Outpatient Physical Therapy  [address]  [place] [address]  Phone:  [phone]  Fax:  [phone]    Date of Evaluation: 11/16/2022    Patient: Fernandez Magallanes  YOB: 1975  MRN: [id number]     Referring Provider: [place] [address]   Referring Diagnosis Chronic pain of right knee [M25.561, G89.29]     Time Calculation  Start time: 1043  Stop time: 1116 Time Calculation (min): 33 minutes         Chief Complaint: Knee Problem    Visit Diagnoses     ICD-10-CM   1. Chronic pain of right knee  M25.561    G89.29       Date of onset of impairment: No data found    Subjective:   History of Present Illness:     Mechanism of injury:  Pt late to check in -got lost (standard 10-15 min prior to appt); resulting in delayed start to session to allow for check-in procedures.    Patient is a 47 year old male with a PMH including: [place] Hunt syndrome, inguinal hernia laparoscopic R (8/30/19), L TKA, R TKA (1990's). Per review of medical notes, history of surgery on both knees when he was in about high school, secondary to a football injury. Pt reporting he has a bad heart.     Pt presents to therapy with complaints of knee pain B x \"majority of adult life\" with \"most recent debilitating pain affects his right knee and started 4 to 5 months ago\" per MD note on 11/14/22. Pt reporting he had COVID and was sitting around more; states he was no longer laying in bed and tried getting up to complete normal ADL's with increased pain.   Received steroid injection 11/14/22 R knee. Has numbness in anterior knees B, endorses numbness in B feet x few years-states no change with this. Has hx of back pain.     Currently denies changes in bowel and bladder, saddle anesthesia, significant weight changes, chills/night sweats, nausea and vomiting, and unexplained fatigue. Denies " "history of cancer. Pt consents to evaluation and treatment today.       Prior level of function:  SPC for past 4-5 months, pain in   Sleep disturbance:  Interrupted sleep (once a month)  Pain:     Current pain ratin    At best pain ratin    At worst pain ratin    Location:  Lateral and medial knee    Quality:  Aching    Pain Comments::  Aggravating: ambulation>10 min on even terrain (less uneven), prolonged standing >immediately; can stand about 5 min before requiring seated rest break, curbs, stairs ascending/descending, bending the knee    Relieving: sitting   Social Support:     Lives in:  Apartment (no entry steps)    Lives with: roommate.  Diagnostic Tests:     Diagnostic Tests Comments:  Knee x ray 11/3/22:  IMPRESSION:     1.  There is moderate tricompartmental osteoarthritis of the right knee most significant in the lateral compartment with degenerative meniscal calcification.    Treatments:     Treatment Comments:  Injection   Activities of Daily Living:     Patient reported ADL status: Patient's current daily routine includes:  Work: Not working   Hobbies: None  Exercise: No current exercise routine in place    ADL's:   Roommate is assisting with chores, meals, walking dog when knee swells and knee pain \"gets bad\" otherwise completion with pain.         Patient Goals:     Other patient goals:  ADL's without pain    Past Medical History:   Diagnosis Date    Congestive heart failure (HCC)     Hypertension     Pain     abdominal pain from hernia     Past Surgical History:   Procedure Laterality Date    INGUINAL HERNIA LAPAROSCOPIC Right 2019    Procedure: REPAIR, HERNIA, INGUINAL, LAPAROSCOPIC;  Surgeon: John H Ganser, M.D.;  Location: SURGERY [facility];  Service: General    KNEE REPLACEMENT, TOTAL Left     KNEE REPLACEMENT, TOTAL Right      Social History     Tobacco Use    Smoking status: Former     Packs/day: 1.00     Years: 22.00     Pack years: 22.00     Types: Cigarettes "     Quit date:      Years since quittin.8    Smokeless tobacco: Never   Substance Use Topics    Alcohol use: No     Family and Occupational History     Socioeconomic History    Marital status:      Spouse name: Not on file    Number of children: Not on file    Years of education: Not on file    Highest education level: Not on file   Occupational History    Not on file       Objective     Postural Observations  Seated posture: poor  Standing posture: poor    Additional Postural Observation Details  Forward head and rounded shoulders seated    Genu valgum in standing  Increased adipose tissues BLE's; no pitting edema noted    Neurological Testing     Myotome testing   Lumbar (left)   All left lumbar myotomes within normal limits    Lumbar (right)   All right lumbar myotomes within normal limits    Dermatome testing   Lumbar (left)   L1: intact  L2: intact  L3: intact  L4: decreased  L5: decreased  S1: decreased  S2: intact    Lumbar (right)   L1: intact  L2: intact  L3: intact  L4: decreased  L5: decreased  S1: decreased  S2: intact    Additional Neurological Details  Decreased sensation B plantar aspect of feet     Tenderness     Additional Tenderness Details  TTP medial joint line B    Active Range of Motion     Additional Active Range of Motion Details  AROM (tested in lying):   0-105 deg R knee  0-104 deg L knee     PROM: 120 deg B knees     Patellar Mobility   Left Knee Hypomobile in the left medial, left lateral, left superior and left inferior patellar tendon(s).     Right Knee Hypomobile in the medial, lateral, superior and inferior patellar tendon(s).     Strength:      Additional Strength Details  3/4 AROM bridge standard    Tests       Lumbar spine (left)      Negative slump.   Lumbar spine (right)     Negative slump.     Left Knee   Negative anterior Lachman.     Right Knee   Negative anterior Lachman.     General Comments     Spine Comments   Function:   Requires BUE's for sit to stands  "due to weakness with hyperextension of knees      Therapeutic Exercises (CPT 27914):     1. no hep today-pt had injection (no extensive exercise allotted x 2 days following injection), hep to begin next session    2. pt education, re: avoid extensive exercise for 2 days following injection and complete only mild activity for remaining week unless otherwise stated by MD    Time-based treatments/modalities:           Assessment, Response and Plan:   Impairments: abnormal or restricted ROM, activity intolerance, impaired physical strength, lacks appropriate home exercise program and limited ADL's    Assessment details:  Patient is a pleasant and cooperative 47 year old male who presents to therapy with chronic B knee pain for \"most of adult life.\" Endorses exacerbation past 4-5 months after attempting to complete ADL's following sedentary bout due to COVID.   Exam findings suggestive of global deconditioning, impaired patellar mobility, hip and knee ROM, and proximal pelvic girdle strength. Pt may benefit from skilled physical therapy in order to address above impairments in order to improve QOL and return to reported ADL's.     Barriers to therapy:  Comorbidities  Other barriers to therapy:  Chronicity of complaints  Prognosis: fair    Goals:   Short Term Goals:   1. Pt will be independent with written HEP.  2. Pt will be able to initiate hep with therapist guidance (unable to start today due to recent steroid injection).   Short term goal time span:  2-4 weeks      Long Term Goals:    1. Pt will be independent with written HEP.  2. Pt will be able to navigate one flight of stairs with step through gait and 1 HR without incr in baseline s/s in order to navigate community  3. Pt will be able to stand x 20 min without being limited by s/s in order to complete normal ADL's and self care.       Long term goal time span:  6-8 weeks    Plan:   Therapy options:  Physical therapy treatment to continue  Planned therapy " interventions:  Neuromuscular Re-education (CPT 63081) and Therapeutic Exercise (CPT 55523)  Frequency: 1-2x/wk.  Duration in visits:  8  Discussed with:  Patient  Plan details:  UPOC: 1/13/23    [id number] and [id number] per visit for 8 visits    Functional Assessment Used        Referring provider co-signature:  I have reviewed this plan of care and my co-signature certifies the need for services.    Certification Period: 11/16/2022 to  1/13/23    Physician Signature: ________________________________ Date: ______________

## 2022-11-17 DIAGNOSIS — B02.21 RAMSAY HUNT SYNDROME (GENICULATE HERPES ZOSTER): ICD-10-CM

## 2022-11-17 DIAGNOSIS — M25.561 CHRONIC PAIN OF RIGHT KNEE: ICD-10-CM

## 2022-11-17 DIAGNOSIS — G89.29 CHRONIC PAIN OF RIGHT KNEE: ICD-10-CM

## 2022-11-18 RX ORDER — IBUPROFEN 800 MG/1
800 TABLET ORAL EVERY 8 HOURS PRN
Qty: 90 TABLET | Refills: 3 | Status: SHIPPED | OUTPATIENT
Start: 2022-11-18 | End: 2024-03-06 | Stop reason: SDUPTHER

## 2022-11-18 RX ORDER — GABAPENTIN 300 MG/1
300 CAPSULE ORAL
Qty: 90 CAPSULE | Refills: 3 | Status: SHIPPED | OUTPATIENT
Start: 2022-11-18

## 2022-11-22 ENCOUNTER — PHYSICAL THERAPY (OUTPATIENT)
Dept: PHYSICAL THERAPY | Facility: MEDICAL CENTER | Age: 47
End: 2022-11-22
Attending: STUDENT IN AN ORGANIZED HEALTH CARE EDUCATION/TRAINING PROGRAM
Payer: MEDICAID

## 2022-11-22 DIAGNOSIS — G89.29 CHRONIC PAIN OF RIGHT KNEE: ICD-10-CM

## 2022-11-22 DIAGNOSIS — M25.561 CHRONIC PAIN OF RIGHT KNEE: ICD-10-CM

## 2022-11-22 PROCEDURE — 97110 THERAPEUTIC EXERCISES: CPT

## 2022-11-22 NOTE — OP THERAPY DAILY TREATMENT
"  Outpatient Physical Therapy  DAILY TREATMENT     [facility] Outpatient Physical Therapy  [address]  [place] [address]  Phone:  [phone]  Fax:  [phone]    Date: 11/22/2022    Patient: Fernandez Magallanes  YOB: 1975  MRN: [id number]     Time Calculation    Start time: 0946  Stop time: 1027 Time Calculation (min): 41 minutes         Chief Complaint: Knee Problem    Visit #: 2    SUBJECTIVE:  Pt presenting today and reporting he has \"his average pain.\" Reporting he has had a good past two weeks. Pt reporting has hx of severe HTN and congenital heart failure.     OBJECTIVE:  Current objective measures:   Today:  Vitals: (On room air; assessed in sitting) after nustepper:   SpO2: 90%  Bp: 155/106 -has not taken bp   *No signs of distress    Mid session: 94% spO2    Transfers:        From eval:  Active Range of Motion      Additional Active Range of Motion Details  AROM (tested in lying):   0-105 deg R knee  0-104 deg L knee     Patellar Mobility   Left Knee Hypomobile in the left medial, left lateral, left superior and left inferior patellar tendon(s).      Right Knee Hypomobile in the medial, lateral, superior and inferior patellar tendon(s).      Strength:       Additional Strength Details  3/4 AROM bridge standard            Therapeutic Exercises (CPT 92749):     1. upright bike, x5 min, cardiovascular warm up; soreness B knees after 2 min that improved with incr time    2. pt education, re: avoid extensive exercise for 2 days following injection and complete only mild activity for remaining week unless otherwise stated by MD    3. bridges, purple band suprapatellar; x15, hep    4. clamshells, purple in SL x 15 ea, fatigue; hep (some mild discomfort in R SL with R knee moving)    6. hip flexor stretch, x30 sec ea, lying; unable to complete modified claudy position due to pain with R knee flexion; hep    7. sit to stand, x10, edu on sit to stand " mechanics    8. bridge on swissball, 3x endurance hold, 3/4 AROM; fatigue    20. end of session vitals, 85 bpm HR; 95% spO2      Therapeutic Exercise Summary: Access Code: [email or web address]  URL: [email or web address]  Date: 11/22/2022  Prepared by: Duarte Doshi    Exercises  Bridge with Hip Abduction and Resistance - 1 x daily - 7 x weekly - 2 sets - 10 reps  Clam with Resistance - 1 x daily - 7 x weekly - 1 sets - 15 reps  Supine Quadriceps Stretch with Strap on Table - 1 x daily - 7 x weekly - 2 sets - 30 sec hold          Pt performed these exercises with instruction and SPV.  Provided handout with these exercises for daily HEP.        Time-based treatments/modalities:    Physical Therapy Timed Treatment Charges  Therapeutic exercise minutes (CPT 57091): 41 minutes      Pain rating (1-10) before treatment:  lateral and medial knee  Pain rating (1-10) after treatment:     Pain Comments::  Aggravating: ambulation>10 min on even terrain (less uneven), prolonged standing >immediately; can stand about 5 min before requiring seated rest break, curbs, stairs ascending/descending, bending the knee         ASSESSMENT:   Response to treatment:   Introduction of hep today- deferred from last session due to steroid injection administered. Pt demonstrating global weakness at proximal pelvic girdle as observed with difficulty and decreased ROM with ex and use of BUE's to push off during transfers. Pt may continue to benefit from strengthening on plinth with progression to increased WB as tolerated.     Of note, pt's bp elevated; edu pt to take medication from PCP as prescribed (in AM when waking up); pt reporting usually wakes at noon and hasn't taken his medications this morning; encouraged pt to take upon waking amaury if PT is earlier than noon.     PLAN/RECOMMENDATIONS:   Plan for treatment: therapy treatment to continue next visit.  Planned interventions for next visit: continue with current treatment.  Assess response  to hep; progress strengthening   Assess bp before sessions   1x97112 and 3x97110 per visit for 8 visits

## 2022-12-11 ENCOUNTER — PATIENT MESSAGE (OUTPATIENT)
Dept: MEDICAL GROUP | Facility: CLINIC | Age: 47
End: 2022-12-11
Payer: MEDICAID

## 2023-01-02 ENCOUNTER — PATIENT MESSAGE (OUTPATIENT)
Dept: MEDICAL GROUP | Facility: CLINIC | Age: 48
End: 2023-01-02
Payer: MEDICAID

## 2023-01-02 SDOH — ECONOMIC STABILITY: TRANSPORTATION INSECURITY
IN THE PAST 12 MONTHS, HAS LACK OF RELIABLE TRANSPORTATION KEPT YOU FROM MEDICAL APPOINTMENTS, MEETINGS, WORK OR FROM GETTING THINGS NEEDED FOR DAILY LIVING?: NO

## 2023-01-02 SDOH — ECONOMIC STABILITY: FOOD INSECURITY: WITHIN THE PAST 12 MONTHS, THE FOOD YOU BOUGHT JUST DIDN'T LAST AND YOU DIDN'T HAVE MONEY TO GET MORE.: NEVER TRUE

## 2023-01-02 SDOH — HEALTH STABILITY: PHYSICAL HEALTH: ON AVERAGE, HOW MANY DAYS PER WEEK DO YOU ENGAGE IN MODERATE TO STRENUOUS EXERCISE (LIKE A BRISK WALK)?: 2 DAYS

## 2023-01-02 SDOH — ECONOMIC STABILITY: TRANSPORTATION INSECURITY
IN THE PAST 12 MONTHS, HAS THE LACK OF TRANSPORTATION KEPT YOU FROM MEDICAL APPOINTMENTS OR FROM GETTING MEDICATIONS?: NO

## 2023-01-02 SDOH — ECONOMIC STABILITY: HOUSING INSECURITY
IN THE LAST 12 MONTHS, WAS THERE A TIME WHEN YOU DID NOT HAVE A STEADY PLACE TO SLEEP OR SLEPT IN A SHELTER (INCLUDING NOW)?: NO

## 2023-01-02 SDOH — ECONOMIC STABILITY: INCOME INSECURITY: IN THE LAST 12 MONTHS, WAS THERE A TIME WHEN YOU WERE NOT ABLE TO PAY THE MORTGAGE OR RENT ON TIME?: NO

## 2023-01-02 SDOH — ECONOMIC STABILITY: FOOD INSECURITY: WITHIN THE PAST 12 MONTHS, YOU WORRIED THAT YOUR FOOD WOULD RUN OUT BEFORE YOU GOT MONEY TO BUY MORE.: NEVER TRUE

## 2023-01-02 SDOH — HEALTH STABILITY: MENTAL HEALTH
STRESS IS WHEN SOMEONE FEELS TENSE, NERVOUS, ANXIOUS, OR CAN'T SLEEP AT NIGHT BECAUSE THEIR MIND IS TROUBLED. HOW STRESSED ARE YOU?: ONLY A LITTLE

## 2023-01-02 SDOH — ECONOMIC STABILITY: INCOME INSECURITY: HOW HARD IS IT FOR YOU TO PAY FOR THE VERY BASICS LIKE FOOD, HOUSING, MEDICAL CARE, AND HEATING?: SOMEWHAT HARD

## 2023-01-02 SDOH — ECONOMIC STABILITY: TRANSPORTATION INSECURITY
IN THE PAST 12 MONTHS, HAS LACK OF TRANSPORTATION KEPT YOU FROM MEETINGS, WORK, OR FROM GETTING THINGS NEEDED FOR DAILY LIVING?: NO

## 2023-01-02 SDOH — HEALTH STABILITY: PHYSICAL HEALTH: ON AVERAGE, HOW MANY MINUTES DO YOU ENGAGE IN EXERCISE AT THIS LEVEL?: 10 MIN

## 2023-01-02 SDOH — ECONOMIC STABILITY: HOUSING INSECURITY: IN THE LAST 12 MONTHS, HOW MANY PLACES HAVE YOU LIVED?: 1

## 2023-01-02 ASSESSMENT — LIFESTYLE VARIABLES
HOW OFTEN DO YOU HAVE SIX OR MORE DRINKS ON ONE OCCASION: NEVER
HOW OFTEN DO YOU HAVE A DRINK CONTAINING ALCOHOL: NEVER
AUDIT-C TOTAL SCORE: 0
HOW MANY STANDARD DRINKS CONTAINING ALCOHOL DO YOU HAVE ON A TYPICAL DAY: PATIENT DOES NOT DRINK
SKIP TO QUESTIONS 9-10: 1

## 2023-01-02 ASSESSMENT — SOCIAL DETERMINANTS OF HEALTH (SDOH)
HOW OFTEN DO YOU ATTENT MEETINGS OF THE CLUB OR ORGANIZATION YOU BELONG TO?: NEVER
HOW OFTEN DO YOU HAVE SIX OR MORE DRINKS ON ONE OCCASION: NEVER
HOW OFTEN DO YOU ATTENT MEETINGS OF THE CLUB OR ORGANIZATION YOU BELONG TO?: NEVER
IN A TYPICAL WEEK, HOW MANY TIMES DO YOU TALK ON THE PHONE WITH FAMILY, FRIENDS, OR NEIGHBORS?: MORE THAN THREE TIMES A WEEK
DO YOU BELONG TO ANY CLUBS OR ORGANIZATIONS SUCH AS CHURCH GROUPS UNIONS, FRATERNAL OR ATHLETIC GROUPS, OR SCHOOL GROUPS?: NO
WITHIN THE PAST 12 MONTHS, YOU WORRIED THAT YOUR FOOD WOULD RUN OUT BEFORE YOU GOT THE MONEY TO BUY MORE: NEVER TRUE
IN A TYPICAL WEEK, HOW MANY TIMES DO YOU TALK ON THE PHONE WITH FAMILY, FRIENDS, OR NEIGHBORS?: MORE THAN THREE TIMES A WEEK
HOW OFTEN DO YOU ATTEND CHURCH OR RELIGIOUS SERVICES?: 1 TO 4 TIMES PER YEAR
HOW MANY DRINKS CONTAINING ALCOHOL DO YOU HAVE ON A TYPICAL DAY WHEN YOU ARE DRINKING: PATIENT DOES NOT DRINK
HOW HARD IS IT FOR YOU TO PAY FOR THE VERY BASICS LIKE FOOD, HOUSING, MEDICAL CARE, AND HEATING?: SOMEWHAT HARD
HOW OFTEN DO YOU ATTEND CHURCH OR RELIGIOUS SERVICES?: 1 TO 4 TIMES PER YEAR
HOW OFTEN DO YOU HAVE A DRINK CONTAINING ALCOHOL: NEVER
DO YOU BELONG TO ANY CLUBS OR ORGANIZATIONS SUCH AS CHURCH GROUPS UNIONS, FRATERNAL OR ATHLETIC GROUPS, OR SCHOOL GROUPS?: NO
HOW OFTEN DO YOU GET TOGETHER WITH FRIENDS OR RELATIVES?: MORE THAN THREE TIMES A WEEK
HOW OFTEN DO YOU GET TOGETHER WITH FRIENDS OR RELATIVES?: MORE THAN THREE TIMES A WEEK

## 2023-01-03 ENCOUNTER — OFFICE VISIT (OUTPATIENT)
Dept: MEDICAL GROUP | Facility: CLINIC | Age: 48
End: 2023-01-03
Payer: MEDICAID

## 2023-01-03 VITALS
SYSTOLIC BLOOD PRESSURE: 152 MMHG | WEIGHT: 290 LBS | HEIGHT: 78 IN | DIASTOLIC BLOOD PRESSURE: 120 MMHG | TEMPERATURE: 98.1 F | RESPIRATION RATE: 16 BRPM | HEART RATE: 90 BPM | BODY MASS INDEX: 33.55 KG/M2 | OXYGEN SATURATION: 90 %

## 2023-01-03 DIAGNOSIS — Z11.59 ENCOUNTER FOR HCV SCREENING TEST FOR LOW RISK PATIENT: ICD-10-CM

## 2023-01-03 DIAGNOSIS — I10 PRIMARY HYPERTENSION: ICD-10-CM

## 2023-01-03 DIAGNOSIS — Z12.11 SCREENING FOR COLON CANCER: ICD-10-CM

## 2023-01-03 DIAGNOSIS — Z23 NEED FOR VACCINATION: ICD-10-CM

## 2023-01-03 DIAGNOSIS — G89.29 CHRONIC PAIN OF LEFT KNEE: ICD-10-CM

## 2023-01-03 DIAGNOSIS — M25.562 CHRONIC PAIN OF LEFT KNEE: ICD-10-CM

## 2023-01-03 DIAGNOSIS — Z11.4 SCREENING FOR HIV (HUMAN IMMUNODEFICIENCY VIRUS): ICD-10-CM

## 2023-01-03 PROCEDURE — 90686 IIV4 VACC NO PRSV 0.5 ML IM: CPT | Performed by: STUDENT IN AN ORGANIZED HEALTH CARE EDUCATION/TRAINING PROGRAM

## 2023-01-03 PROCEDURE — 99213 OFFICE O/P EST LOW 20 MIN: CPT | Mod: GE,25 | Performed by: STUDENT IN AN ORGANIZED HEALTH CARE EDUCATION/TRAINING PROGRAM

## 2023-01-03 PROCEDURE — 90746 HEPB VACCINE 3 DOSE ADULT IM: CPT | Performed by: STUDENT IN AN ORGANIZED HEALTH CARE EDUCATION/TRAINING PROGRAM

## 2023-01-03 PROCEDURE — 90471 IMMUNIZATION ADMIN: CPT | Performed by: STUDENT IN AN ORGANIZED HEALTH CARE EDUCATION/TRAINING PROGRAM

## 2023-01-03 PROCEDURE — 90472 IMMUNIZATION ADMIN EACH ADD: CPT | Performed by: STUDENT IN AN ORGANIZED HEALTH CARE EDUCATION/TRAINING PROGRAM

## 2023-01-03 RX ORDER — HYDROCHLOROTHIAZIDE 25 MG/1
TABLET ORAL
COMMUNITY

## 2023-01-03 ASSESSMENT — PATIENT HEALTH QUESTIONNAIRE - PHQ9: CLINICAL INTERPRETATION OF PHQ2 SCORE: 0

## 2023-01-04 NOTE — PROGRESS NOTES
"Subjective:     CC: left knee pain    HPI:   Fernandez presents today with left knee pain    Chronic left knee pain. Had both knees operated on in high school after a football injury. Thinks he had both ACLs repaired. Right was worse than left. It is painful and gets swollen with use. Some instability as well. He got significant relief from steroid injection on right knee.     Problem   Need for Vaccination   Screening for Colon Cancer   Bmi 31.0-31.9,Adult   Chronic Pain of Left Knee   Encounter for Hcv Screening Test for Low Risk Patient   Screening for HIV (Human Immunodeficiency Virus)       Current Outpatient Medications Ordered in Epic   Medication Sig Dispense Refill    gabapentin (NEURONTIN) 300 MG Cap Take 1 Capsule by mouth at bedtime as needed (pain). 90 Capsule 3    ibuprofen (MOTRIN) 800 MG Tab Take 1 Tablet by mouth every 8 hours as needed for Mild Pain, Moderate Pain, Fever, Headache or Inflammation. 90 Tablet 3    losartan (COZAAR) 50 MG Tab Take 1 Tablet by mouth every day.      carvedilol (COREG) 6.25 MG Tab Take 1 Tablet by mouth 2 times a day with meals.      hydroCHLOROthiazide (HYDRODIURIL) 25 MG Tab        No current Epic-ordered facility-administered medications on file.       ROS:  Gen: no fevers, no chills  Eyes: no vision changes  ENT: no sore throat, no bloody nose  Pulm: no sob, no cough  CV: no chest pain, no palpitations  GI: no vomiting, no diarrhea  : no urinary changes  Skin: no rash  Neuro: no headaches, no numbness      Objective:     Exam:  BP (!) 152/120 (BP Location: Left arm, Patient Position: Sitting, BP Cuff Size: Adult) Comment: Patient out of medication  Pulse 90   Temp 36.7 °C (98.1 °F) (Temporal)   Resp 16   Ht 2.032 m (6' 8\")   Wt (!) 132 kg (290 lb)   SpO2 90%   BMI 31.86 kg/m²  Body mass index is 31.86 kg/m².    Gen: Alert and oriented, No apparent distress.   Neck: Full range of motion, no lymphadenopathy  Lungs: Normal effort, no wheezing or rales  CV: Regular " rate and rhythm. No murmurs  Ext: Moving all extremities, 2+ radial pulses bilaterally, left knee with crepitus on flexion and extension    Assessment & Plan:     47 y.o. male with the following -     Problem List Items Addressed This Visit       Need for vaccination    Relevant Orders    Hepatitis B Vaccine Adult 20+ (Completed)    INFLUENZA VACCINE QUAD INJ (PF) (Completed)    Screening for colon cancer    Relevant Orders    Referral to GI for Colonoscopy    BMI 31.0-31.9,adult    Relevant Orders    HEMOGLOBIN A1C    Chronic pain of left knee    Relevant Orders    DX-KNEE 3 VIEWS LEFT    Encounter for HCV screening test for low risk patient    Relevant Orders    HEP C VIRUS ANTIBODY    Screening for HIV (human immunodeficiency virus)    Relevant Orders    HIV AG/AB COMBO ASSAY SCREENING       47 yoM with left knee pain. Has OA on right knee that benefited from steroid injection. Received 80 mg Depo-Medrol in Nov 2022. He would like to have left knee injected. Will order x-rays of left knee and if they demonstrate OA will plan to inject left knee. Discussed that we need to space out the injections. Plan for 3 months from previous.     Screening labs of HCV, HIV and A1c. Screening colonoscopy (discussed to call insurance to see if they cover at 43 yo recommendation). Vaccines today.

## 2023-01-05 ENCOUNTER — APPOINTMENT (OUTPATIENT)
Dept: RADIOLOGY | Facility: MEDICAL CENTER | Age: 48
End: 2023-01-05
Attending: STUDENT IN AN ORGANIZED HEALTH CARE EDUCATION/TRAINING PROGRAM
Payer: MEDICAID

## 2023-01-05 PROCEDURE — 73562 X-RAY EXAM OF KNEE 3: CPT | Mod: LT

## 2023-01-09 ENCOUNTER — TELEPHONE (OUTPATIENT)
Dept: PHYSICAL THERAPY | Facility: MEDICAL CENTER | Age: 48
End: 2023-01-09
Payer: MEDICAID

## 2023-01-09 ENCOUNTER — PHYSICAL THERAPY (OUTPATIENT)
Dept: PHYSICAL THERAPY | Facility: MEDICAL CENTER | Age: 48
End: 2023-01-09
Attending: STUDENT IN AN ORGANIZED HEALTH CARE EDUCATION/TRAINING PROGRAM
Payer: MEDICAID

## 2023-01-09 DIAGNOSIS — G89.29 CHRONIC PAIN OF RIGHT KNEE: ICD-10-CM

## 2023-01-09 DIAGNOSIS — M25.561 CHRONIC PAIN OF RIGHT KNEE: ICD-10-CM

## 2023-01-09 PROCEDURE — 97110 THERAPEUTIC EXERCISES: CPT

## 2023-01-09 NOTE — OP THERAPY DAILY TREATMENT
Outpatient Physical Therapy  DAILY TREATMENT     [facility] Outpatient Physical Therapy  [address]  [place] [address]  Phone:  [phone]  Fax:  [phone]    Date: 01/09/2023    Patient: Fernandez Magallanes  YOB: 1975  MRN: [id number]     Time Calculation    Start time: 1449  Stop time: 1508 Time Calculation (min): 19 minutes         Chief Complaint: Knee Problem      Visit #: 3    SUBJECTIVE:  Pt reporting injection really helped with swelling and mobility with the knee. No longer using cane. States he has not been compliant with his hep.     OBJECTIVE:  Current objective measures:   Today:  Vitals: (On room air; assessed in sitting):     Bp:   1st trial: 160/80 -pt stating he took his bp meds this morning but has not taken losartan as he is out  2nd trial: 157/106    96% spO2  HR: 86 bpm     Therapeutic Exercises (CPT 91716):     1. pt education, re: contact cardiologist and discuss vitals from today    20. end of session vitals, 85 bpm HR; 95% spO2    Time-based treatments/modalities:    Physical Therapy Timed Treatment Charges  Therapeutic exercise minutes (CPT 24203): 19 minutes      Pain rating (1-10) before treatment:  lateral and medial knee  Pain rating (1-10) after treatment:     Pain Comments::  Aggravating: ambulation>10 min on even terrain (less uneven), prolonged standing >immediately; can stand about 5 min before requiring seated rest break, curbs, stairs ascending/descending, bending the knee         ASSESSMENT:   Response to treatment:   Bp elevated today during session-pt reporting he took his bp med this morning but is out of one medication from cardiology-is attempting to reach cardio for refill; therefore decision to hold PT today. Discussion with pt today to contact cardiologist to inform. Pt appearing anxious in session today and speaking rapidly; demeanor different than in prior sessions seen. Pt denying visual changes,  headaches, states he is asymptomatic. Will reach out to pt's PCP to inform of vitals from today's session.       PLAN/RECOMMENDATIONS:   Plan for treatment: therapy treatment to continue next visit.  Planned interventions for next visit: continue with current treatment.  Assess response to hep; progress strengthening   Assess bp before sessions   1x97112 and 3x97110 per visit for 8 visits

## 2023-01-10 RX ORDER — LOSARTAN POTASSIUM 50 MG/1
50 TABLET ORAL DAILY
Qty: 30 TABLET | Refills: 1 | Status: SHIPPED | OUTPATIENT
Start: 2023-01-10

## 2023-01-11 ENCOUNTER — APPOINTMENT (OUTPATIENT)
Dept: PHYSICAL THERAPY | Facility: MEDICAL CENTER | Age: 48
End: 2023-01-11
Attending: STUDENT IN AN ORGANIZED HEALTH CARE EDUCATION/TRAINING PROGRAM
Payer: MEDICAID

## 2023-01-11 NOTE — OP THERAPY DAILY TREATMENT
Outpatient Physical Therapy  DAILY TREATMENT     [facility] Outpatient Physical Therapy  [address]  [place] [address]  Phone:  [phone]  Fax:  [phone]    Date: 01/11/2023    Patient: Fernandez Magallanes  YOB: 1975  MRN: [id number]     Time Calculation                   Chief Complaint: No chief complaint on file.      Visit #: 4    SUBJECTIVE:  Pt reporting injection really helped with swelling and mobility with the knee. No longer using cane. States he has not been compliant with his hep.     OBJECTIVE:  Current objective measures:   Today:  Vitals: (On room air; assessed in sitting):     Bp:   1st trial: 160/80 -pt stating he took his bp meds this morning but has not taken losartan as he is out  2nd trial: 157/106    96% spO2  HR: 86 bpm     Therapeutic Exercises (CPT 30188):     1. pt education, re: contact cardiologist and discuss vitals from today    20. end of session vitals, 85 bpm HR; 95% spO2    Time-based treatments/modalities:           Pain rating (1-10) before treatment:  lateral and medial knee  Pain rating (1-10) after treatment:     Pain Comments::  Aggravating: ambulation>10 min on even terrain (less uneven), prolonged standing >immediately; can stand about 5 min before requiring seated rest break, curbs, stairs ascending/descending, bending the knee         ASSESSMENT:   Response to treatment:   Bp elevated today during session-pt reporting he took his bp med this morning but is out of one medication from cardiology-is attempting to reach cardio for refill; therefore decision to hold PT today. Discussion with pt today to contact cardiologist to inform. Pt appearing anxious in session today and speaking rapidly; demeanor different than in prior sessions seen. Pt denying visual changes, headaches, states he is asymptomatic. Will reach out to pt's PCP to inform of vitals from today's session.       PLAN/RECOMMENDATIONS:   Plan for  treatment: therapy treatment to continue next visit.  Planned interventions for next visit: continue with current treatment.  Assess response to hep; progress strengthening   Assess bp before sessions   1x97112 and 3x97110 per visit for 8 visits

## 2023-01-16 ENCOUNTER — APPOINTMENT (OUTPATIENT)
Dept: PHYSICAL THERAPY | Facility: MEDICAL CENTER | Age: 48
End: 2023-01-16
Attending: STUDENT IN AN ORGANIZED HEALTH CARE EDUCATION/TRAINING PROGRAM
Payer: MEDICAID

## 2023-01-16 LAB — HBA1C MFR BLD: 6 % (ref 0–5.6)

## 2023-01-16 NOTE — OP THERAPY DAILY TREATMENT
Outpatient Physical Therapy  DAILY TREATMENT     [facility] Outpatient Physical Therapy  [address]  [place] [address]  Phone:  [phone]  Fax:  [phone]    Date: 01/16/2023    Patient: Fernandez Magallanes  YOB: 1975  MRN: [id number]     Time Calculation                   Chief Complaint: No chief complaint on file.      Visit #: 4    SUBJECTIVE:  Pt reporting injection really helped with swelling and mobility with the knee. No longer using cane. States he has not been compliant with his hep.     OBJECTIVE:  Current objective measures:   Today:  Vitals: (On room air; assessed in sitting):     Bp:   1st trial: 160/80 -pt stating he took his bp meds this morning but has not taken losartan as he is out  2nd trial: 157/106    96% spO2  HR: 86 bpm     Therapeutic Exercises (CPT 25319):     1. pt education, re: contact cardiologist and discuss vitals from today    20. end of session vitals, 85 bpm HR; 95% spO2    Time-based treatments/modalities:           Pain rating (1-10) before treatment:  lateral and medial knee  Pain rating (1-10) after treatment:     Pain Comments::  Aggravating: ambulation>10 min on even terrain (less uneven), prolonged standing >immediately; can stand about 5 min before requiring seated rest break, curbs, stairs ascending/descending, bending the knee         ASSESSMENT:   Response to treatment:   Bp elevated today during session-pt reporting he took his bp med this morning but is out of one medication from cardiology-is attempting to reach cardio for refill; therefore decision to hold PT today. Discussion with pt today to contact cardiologist to inform. Pt appearing anxious in session today and speaking rapidly; demeanor different than in prior sessions seen. Pt denying visual changes, headaches, states he is asymptomatic. Will reach out to pt's PCP to inform of vitals from today's session.       PLAN/RECOMMENDATIONS:   Plan for  treatment: therapy treatment to continue next visit.  Planned interventions for next visit: continue with current treatment.  Assess response to hep; progress strengthening   Assess bp before sessions   1x97112 and 3x97110 per visit for 8 visits

## 2023-01-18 ENCOUNTER — APPOINTMENT (OUTPATIENT)
Dept: PHYSICAL THERAPY | Facility: MEDICAL CENTER | Age: 48
End: 2023-01-18
Attending: STUDENT IN AN ORGANIZED HEALTH CARE EDUCATION/TRAINING PROGRAM
Payer: MEDICAID

## 2023-01-20 ENCOUNTER — OFFICE VISIT (OUTPATIENT)
Dept: MEDICAL GROUP | Facility: CLINIC | Age: 48
End: 2023-01-20
Payer: MEDICAID

## 2023-01-20 VITALS
OXYGEN SATURATION: 90 % | TEMPERATURE: 98 F | HEIGHT: 78 IN | BODY MASS INDEX: 36.45 KG/M2 | HEART RATE: 96 BPM | SYSTOLIC BLOOD PRESSURE: 146 MMHG | DIASTOLIC BLOOD PRESSURE: 108 MMHG | WEIGHT: 315 LBS

## 2023-01-20 DIAGNOSIS — M17.0 OSTEOARTHRITIS OF BOTH KNEES, UNSPECIFIED OSTEOARTHRITIS TYPE: ICD-10-CM

## 2023-01-20 PROCEDURE — 20610 DRAIN/INJ JOINT/BURSA W/O US: CPT | Mod: LT | Performed by: STUDENT IN AN ORGANIZED HEALTH CARE EDUCATION/TRAINING PROGRAM

## 2023-02-06 ENCOUNTER — OFFICE VISIT (OUTPATIENT)
Dept: MEDICAL GROUP | Facility: CLINIC | Age: 48
End: 2023-02-06
Payer: MEDICAID

## 2023-02-06 VITALS
TEMPERATURE: 97.6 F | HEART RATE: 84 BPM | HEIGHT: 78 IN | BODY MASS INDEX: 36.45 KG/M2 | OXYGEN SATURATION: 92 % | WEIGHT: 315 LBS | DIASTOLIC BLOOD PRESSURE: 94 MMHG | RESPIRATION RATE: 20 BRPM | SYSTOLIC BLOOD PRESSURE: 138 MMHG

## 2023-02-06 DIAGNOSIS — E11.42 DIABETIC POLYNEUROPATHY ASSOCIATED WITH TYPE 2 DIABETES MELLITUS (HCC): ICD-10-CM

## 2023-02-06 DIAGNOSIS — R73.03 PREDIABETES: ICD-10-CM

## 2023-02-06 DIAGNOSIS — L97.509 DIABETIC ULCER OF TOE ASSOCIATED WITH TYPE 2 DIABETES MELLITUS, UNSPECIFIED LATERALITY, UNSPECIFIED ULCER STAGE (HCC): ICD-10-CM

## 2023-02-06 DIAGNOSIS — E11.621 DIABETIC ULCER OF TOE ASSOCIATED WITH TYPE 2 DIABETES MELLITUS, UNSPECIFIED LATERALITY, UNSPECIFIED ULCER STAGE (HCC): ICD-10-CM

## 2023-02-06 PROCEDURE — 99214 OFFICE O/P EST MOD 30 MIN: CPT | Mod: GC | Performed by: STUDENT IN AN ORGANIZED HEALTH CARE EDUCATION/TRAINING PROGRAM

## 2023-02-07 NOTE — PROGRESS NOTES
"Subjective:     CC: toe pain    HPI:   Fernandez presents today with toe pain on middle toe of left foot. He noticed it a few days ago. It is painful to walk on. No fever or chills. He wears socks and shoes regularly.    Problem   Prediabetes   Diabetic Polyneuropathy Associated With Type 2 Diabetes Mellitus (Hcc)   Diabetic Ulcer of Toe Associated With Type 2 Diabetes Mellitus (Hcc)       Current Outpatient Medications Ordered in Epic   Medication Sig Dispense Refill    metFORMIN (GLUCOPHAGE) 500 MG Tab Take 1 Tablet by mouth 2 times a day with meals. 60 Tablet 2    losartan (COZAAR) 50 MG Tab Take 1 Tablet by mouth every day. 30 Tablet 1    hydroCHLOROthiazide (HYDRODIURIL) 25 MG Tab       gabapentin (NEURONTIN) 300 MG Cap Take 1 Capsule by mouth at bedtime as needed (pain). 90 Capsule 3    ibuprofen (MOTRIN) 800 MG Tab Take 1 Tablet by mouth every 8 hours as needed for Mild Pain, Moderate Pain, Fever, Headache or Inflammation. 90 Tablet 3    carvedilol (COREG) 6.25 MG Tab Take 1 Tablet by mouth 2 times a day with meals.       No current Epic-ordered facility-administered medications on file.       ROS:  Gen: no fevers, no chills  ENT: no sore throat, no bloody nose  Pulm: no sob, no cough  CV: no chest pain, no palpitations  GI: no vomiting, no diarrhea  : no urinary changes  Skin: no rash  Neuro: no headaches, yes pain in toe      Objective:     Exam:  BP (!) 138/94 (BP Location: Left arm, Patient Position: Sitting, BP Cuff Size: Large adult)   Pulse 84   Temp 36.4 °C (97.6 °F)   Resp 20   Ht 2.032 m (6' 8\")   Wt (!) 174 kg (383 lb)   SpO2 92%   BMI 42.07 kg/m²  Body mass index is 42.07 kg/m².    Gen: Alert and oriented, No apparent distress.   Neck: Full range of motion, no lymphadenopathy  Lungs: Normal effort, no wheezing or rales  CV: Regular rate and rhythm. No murmurs  Ext: Moving all extremities, 2+ radial pulses bilaterally, decreased sensation in feet, small ulcer on tip of left third " toe    Assessment & Plan:     48 y.o. male with the following -     Problem List Items Addressed This Visit       BMI 40.0-44.9, adult (HCC)    Relevant Medications    metFORMIN (GLUCOPHAGE) 500 MG Tab    Other Relevant Orders    Lipid Profile    Prediabetes    Relevant Medications    metFORMIN (GLUCOPHAGE) 500 MG Tab    Other Relevant Orders    Comp Metabolic Panel    MICROALBUMIN CREAT RATIO URINE    Lipid Profile    Referral to Optometry    Diabetic polyneuropathy associated with type 2 diabetes mellitus (HCC)    Relevant Medications    metFORMIN (GLUCOPHAGE) 500 MG Tab    Other Relevant Orders    Referral to Optometry    Diabetic ulcer of toe associated with type 2 diabetes mellitus (HCC)    Relevant Medications    metFORMIN (GLUCOPHAGE) 500 MG Tab    Other Relevant Orders    Referral to Wound Clinic       Fernandez is prediabetic based on most recent A1c, but he is having what appears to be diabetic neuropathy in his feet that has led to development of a diabetic foot ulcer on tip of third toe on left foot. He failed monofilament exam today. We discussed good foot hygiene. Given this, we will start metformin for his prediabetes. We will also send him to Optometry for eye exam. We will check CMP, lipids and microalbumin creatinine ratio. We will send to wound care for management of his ulcer. Repeat A1c 3 months from prior.     He was having some right ear pain. 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 His ear looks good today and recommend OTC antihistamine for the itching. Can also consider debrox as he does have a lot of cerumen.    Follow-up in 2 months

## 2023-02-23 ENCOUNTER — NON-PROVIDER VISIT (OUTPATIENT)
Dept: WOUND CARE | Facility: MEDICAL CENTER | Age: 48
End: 2023-02-23
Attending: STUDENT IN AN ORGANIZED HEALTH CARE EDUCATION/TRAINING PROGRAM
Payer: MEDICAID

## 2023-02-23 PROCEDURE — 99211 OFF/OP EST MAY X REQ PHY/QHP: CPT

## 2023-02-23 PROCEDURE — 97597 DBRDMT OPN WND 1ST 20 CM/<: CPT

## 2023-02-23 NOTE — PATIENT INSTRUCTIONS
-Keep your wound dressing clean, dry, and intact.    -Change your dressing every 3 to 4 days or if it becomes soiled, soaked, or falls off.    -Should you experience any significant changes in your wound(s), such as infection (redness, swelling, localized heat, increased pain, fever > 101 F, chills) or have any questions regarding your home care instructions, please contact the wound center at [phone]. If after hours, contact your primary care physician or go to the hospital emergency room.

## 2023-02-23 NOTE — CERTIFICATION
Non Provider Encounter- Diabetic Foot Ulcer      HISTORY OF PRESENT ILLNESS  Wound History:    START OF CARE IN CLINIC: 2/23/2023    REFERRING PROVIDER: Justin Bell D.O.   WOUND- Diabetic foot ulcer   LOCATION: Left Distal 3rd Toe   HISTORY: Patient is a pleasant 48 y.o. who presents with a wound to the left distal 3rd toe that opened about a month ago. Patient stated that he does not remember anything causing the wound, however was cleaning out his closet and trying on old pairs of shoes and wonders if he maybe was bit by a spider. Patient stated that initially he was applying triple antibiotic ointment and a band-aid to the wound but has been leaving it open to air recently. Patient stated that his wound is a lot less painful now than it was.    Pertinent Medical History: HTN; Prediabetes    DIABETES HX: Diagnosed with prediabetes and was recently placed on metformin this month.  Patient does not check blood sugars.  Has not had previous diabetes education.  Does have numbness in feet for the past couple years.  Usually wears shoes and socks, presents today wearing slippers. Does check feet routinely.  Has not had previous foot ulcers or foot surgery.  Current occupation -Unemployed.  Offloading- None.        TOBACCO USE: Former smoker- 2008     OFFLOADING: None    Pertinent Labs and Diagnostics:    Labs:     A1c:   Lab Results   Component Value Date/Time    HBA1C 6 (A) 01/16/2023 12:00 AM          IMAGING: None    VASCULAR STUDIES: None    LAST  WOUND CULTURE:  DATE : None         Patient allergies and medications reviewed via Epic.     WOUND ASSESSMENT-      Wound 02/23/23 Diabetic Ulcer Left distal 3rd toe (Active)   Wound Image   02/23/23 1400   Site Assessment Red 02/23/23 1400   Periwound Assessment Maceration;Callused;Blanchable erythema;Painful 02/23/23 1400   Margins Attached edges 02/23/23 1400   Drainage Amount LUCIANO 02/23/23 1400   Drainage Description LUCIANO 02/23/23 1400   Treatments  Cleansed;Topical Lidocaine;CSWD - Conservative Sharp Wound Debridement 02/23/23 1400   Wound Cleansing Puracyn [place] 02/23/23 1400   Periwound Protectant Skin Protectant Wipes to Periwound;Barrier Paste 02/23/23 1400   Dressing Cleansing/Solutions Not Applicable 02/23/23 1400   Dressing Options Hydrofera Blue Ready;Hypafix Tape 02/23/23 1400   Dressing Changed New 02/23/23 1400   Dressing Change/Treatment Frequency Every 72 hrs, and As Needed 02/23/23 1400   Non-staged Wound Description Full thickness 02/23/23 1400   Wound Length (cm) 0.2 cm 02/23/23 1400   Wound Width (cm) 0.4 cm 02/23/23 1400   Wound Depth (cm) 0.1 cm 02/23/23 1400   Wound Surface Area (cm^2) 0.08 cm^2 02/23/23 1400   Wound Volume (cm^3) 0.008 cm^3 02/23/23 1400   Post-Procedure Length (cm) 0.2 cm 02/23/23 1400   Post-Procedure Width (cm) 0.4 cm 02/23/23 1400   Post-Procedure Depth (cm) 0.1 cm 02/23/23 1400   Post-Procedure Surface Area (cm^2) 0.08 cm^2 02/23/23 1400   Post-Procedure Volume (cm^3) 0.008 cm^3 02/23/23 1400   Tunneling (cm) 0 cm 02/23/23 1400   Undermining (cm) 0 cm 02/23/23 1400   Wound Odor None 02/23/23 1400   Pulses Left;2+;DP; (DP and PT triphasic per Doppler) 02/23/23 1400   Right Foot Monofilament 10-point exam (Sensate) 5/10 02/23/23 1400   Left Foot Monofilament 10-point exam (Sensate) 3/10 02/23/23 1400   Exposed Structures None 02/23/23 1400        Procedures:       -2% viscous lidocaine applied topically to wound bed for approximately 5 minutes prior to debridement  -Curette used to debride wound bed and periwound callus. Entire surface of wound, <1cm2 debrided.  Periwound callus, ~1cm2, debrided to skin level  -Refer to flowsheet for wound care details.     PATIENT EDUCATION  - Importance of tight glucose control for wound healing   - Implications of loss of protective sensation (LOPS) discussed with patient- including increased risk for amputation.  - Advised to check feet at least daily, moisturize feet, and to always  wear protective foot wear.   -  Importance of offloading foot to assist with wound healing  - Advised pt not to trim nails or calluses, seek foot/nail care from podiatrist or certified foot/nail nurse  - Importance of adequate nutrition for wound healing  - Keep your wound dressing clean, dry, and intact.  - Change your dressing every 3 to 4 days or if it becomes soiled, soaked, or falls off.  - Should you experience any significant changes in your wound(s), such as infection (redness, swelling, localized heat, increased pain, fever > 101 F, chills) or have any questions regarding your home care instructions, please contact the wound center at [phone]. If after hours, contact your primary care physician or go to the hospital emergency room.

## 2023-03-03 ENCOUNTER — OFFICE VISIT (OUTPATIENT)
Dept: WOUND CARE | Facility: MEDICAL CENTER | Age: 48
End: 2023-03-03
Attending: STUDENT IN AN ORGANIZED HEALTH CARE EDUCATION/TRAINING PROGRAM
Payer: MEDICAID

## 2023-03-03 VITALS
TEMPERATURE: 97.5 F | OXYGEN SATURATION: 91 % | HEART RATE: 97 BPM | DIASTOLIC BLOOD PRESSURE: 98 MMHG | RESPIRATION RATE: 20 BRPM | SYSTOLIC BLOOD PRESSURE: 150 MMHG

## 2023-03-03 DIAGNOSIS — T14.8XXA WOUND INFECTION: ICD-10-CM

## 2023-03-03 DIAGNOSIS — E66.09 OBESITY DUE TO EXCESS CALORIES WITHOUT SERIOUS COMORBIDITY, UNSPECIFIED CLASSIFICATION: ICD-10-CM

## 2023-03-03 DIAGNOSIS — G62.9 NEUROPATHY: ICD-10-CM

## 2023-03-03 DIAGNOSIS — L97.522 NEUROPATHIC ULCER OF TOE OF LEFT FOOT WITH FAT LAYER EXPOSED (HCC): ICD-10-CM

## 2023-03-03 DIAGNOSIS — R73.03 PREDIABETES: ICD-10-CM

## 2023-03-03 DIAGNOSIS — I87.2 VENOUS INSUFFICIENCY OF BOTH LOWER EXTREMITIES: ICD-10-CM

## 2023-03-03 DIAGNOSIS — R60.0 LOWER EXTREMITY EDEMA: ICD-10-CM

## 2023-03-03 DIAGNOSIS — L08.9 WOUND INFECTION: ICD-10-CM

## 2023-03-03 PROCEDURE — 11042 DBRDMT SUBQ TIS 1ST 20SQCM/<: CPT | Performed by: STUDENT IN AN ORGANIZED HEALTH CARE EDUCATION/TRAINING PROGRAM

## 2023-03-03 PROCEDURE — 11042 DBRDMT SUBQ TIS 1ST 20SQCM/<: CPT

## 2023-03-03 PROCEDURE — 99213 OFFICE O/P EST LOW 20 MIN: CPT

## 2023-03-03 PROCEDURE — 99203 OFFICE O/P NEW LOW 30 MIN: CPT | Mod: 25 | Performed by: STUDENT IN AN ORGANIZED HEALTH CARE EDUCATION/TRAINING PROGRAM

## 2023-03-03 RX ORDER — AMOXICILLIN AND CLAVULANATE POTASSIUM 875; 125 MG/1; MG/1
1 TABLET, FILM COATED ORAL 2 TIMES DAILY
Qty: 14 TABLET | Refills: 0 | Status: SHIPPED | OUTPATIENT
Start: 2023-03-03 | End: 2023-03-10

## 2023-03-03 RX ORDER — DOXYCYCLINE 100 MG/1
100 CAPSULE ORAL 2 TIMES DAILY
Qty: 14 CAPSULE | Refills: 0 | Status: SHIPPED | OUTPATIENT
Start: 2023-03-03 | End: 2023-03-10

## 2023-03-03 ASSESSMENT — ENCOUNTER SYMPTOMS
CHILLS: 0
CLAUDICATION: 0
FEVER: 0
NAUSEA: 0
VOMITING: 0

## 2023-03-04 NOTE — PROGRESS NOTES
Provider Encounter- Neuropathic Foot Ulcer      HISTORY OF PRESENT ILLNESS  Wound History:    START OF CARE IN CLINIC: 2/23/2023    REFERRING PROVIDER:  Justin Bell DO     WOUND- Neuropathic foot ulcer   LOCATION: Left Disal 3rd Toe   HISTORY:  48M with PMHx of prediabetes, obesity, lower extremity edema. Patient presented to clinic with left distal 3rd toe wound and callus. He reports wound started around a month ago when trying on shoes. He was applying triple Abx ointment and bandaid to care at home. Patient also reports history of bad back and neuropathy. Patient was referred to [facility] for further care.     Pertinent Medical History: HTN, Obesity, DM2     Neuropathy HX: Etiology: possibly back problems.  Does  have numbness in feet.  Usually wears non Rx shoes. Does not check feet routinely.  Has not had previous foot ulcers or foot surgery.  Current occupation unemployed.  Offloading none.      TOBACCO USE:  Former smoker    Patient's problem list, allergies, and current medications reviewed and updated in Epic    Interval History:  3/3/2023: Clinic visit with Moustapha Barroso MD. Patient reports doing ok. Denies any fevers or chills. Reports some pain left distal 3rd toe. Not currently draining. His toe is swollen and erythematous. Patient reports difficult time finding appropriate shoes as his foot size is 16 extra wide. Patient also reports some chronic lower extremity edema with some varicose veins. He denies any previous diagnosis of venous insufficiency and does not wear compression. Patient denies any history of previous foot wounds. He does endorse some neuropathy of feet, previously worked up but does not know the etiology. He has history of prediabetes and recently started taking metformin.      REVIEW OF SYSTEMS:   Review of Systems   Constitutional:  Negative for chills, fever and malaise/fatigue.   Cardiovascular:  Positive for leg swelling. Negative for claudication.   Gastrointestinal:   Negative for nausea and vomiting.   Skin:         Open wound left distal 3rd toe with some pain     PHYSICAL EXAMINATION:   BP (!) 150/98 Comment: RN notified  Pulse 97   Temp 36.4 °C (97.5 °F) (Temporal)   Resp 20   SpO2 91%     Physical Exam  Constitutional:       General: He is not in acute distress.     Appearance: He is obese.   Cardiovascular:      Rate and Rhythm: Normal rate.      Comments: Palpable pedal pulses  Pulmonary:      Effort: Pulmonary effort is normal. No respiratory distress.   Musculoskeletal:      Right lower leg: Edema present.      Left lower leg: Edema present.   Skin:     Comments: Left distal 3rd toe full thickness wound - Wound covered with callus, scant purulence was expressed. Minor periwound erythema, some toe swelling.    Patient has lower extremity edema, varicose veins, and subtle hemosiderin staining consistent with CVI.   Neurological:      Mental Status: He is alert.     Monofilament testing with a 10 gram force: sensation intact: decreased bilaterally  Visual Inspection: Feet with ulcer.  Pedal pulses: intact bilaterally      WOUND ASSESSMENT  Wound 02/23/23 Diabetic Ulcer Left distal 3rd toe (Active)   Wound Image    03/03/23 1640   Site Assessment Red 03/03/23 1640   Periwound Assessment Callused;Painful;Blanchable erythema 03/03/23 1640   Margins Unattached edges 03/03/23 1640   Drainage Amount Small 03/03/23 1640   Drainage Description Serosanguineous 03/03/23 1640   Treatments Cleansed;Topical Lidocaine;Provider debridement;Site care 03/03/23 1640   Wound Cleansing Puracyn Manchester 03/03/23 1640   Periwound Protectant Skin Protectant Wipes to Periwound 03/03/23 1640   Dressing Cleansing/Solutions Not Applicable 03/03/23 1640   Dressing Options Hydrofera Blue Ready;Hypafix Tape;Tubigrip 03/03/23 1640   Dressing Changed Changed 03/03/23 1640   Dressing Change/Treatment Frequency Every 72 hrs, and As Needed 03/03/23 1640   Non-staged Wound Description Full thickness 03/03/23  1640   Wound Length (cm) 0.2 cm 02/23/23 1400   Wound Width (cm) 0.4 cm 02/23/23 1400   Wound Depth (cm) 0.1 cm 02/23/23 1400   Wound Surface Area (cm^2) 0.08 cm^2 02/23/23 1400   Wound Volume (cm^3) 0.008 cm^3 02/23/23 1400   Post-Procedure Length (cm) 0.2 cm 03/03/23 1640   Post-Procedure Width (cm) 0.3 cm 03/03/23 1640   Post-Procedure Depth (cm) 0.3 cm 03/03/23 1640   Post-Procedure Surface Area (cm^2) 0.06 cm^2 03/03/23 1640   Post-Procedure Volume (cm^3) 0.018 cm^3 03/03/23 1640   Tunneling (cm) 0 cm 03/03/23 1640   Undermining (cm) 0 cm 03/03/23 1640   Wound Odor None 03/03/23 1640   Pulses Left;2+;DP;Doppler 02/23/23 1400   Right Foot Monofilament 10-point exam (Sensate) 5/10 02/23/23 1400   Left Foot Monofilament 10-point exam (Sensate) 3/10 02/23/23 1400   Exposed Structures None 03/03/23 1640   Number of days: 8           PROCEDURE:   -2% viscous lidocaine applied topically to wound bed for approximately 5 minutes prior to debridement  -Curette used to debride wound bed and periwound callus.  Excisional debridement was performed to remove devitalized tissue until healthy, bleeding tissue was visualized.   Entire surface of wound, 0.06cm2 debrided.  Tissue debrided into the subcutaneous layer.  Periwound callus, ~ 2 cm2, debrided to skin level, excising hyperkeratinized tissue.   -Bleeding controlled with manual pressure.    -Wound care completed by wound RN, refer to flowsheet   -Patient tolerated the procedure well, without c/o pain or discomfort.       Pertinent Labs and Diagnostics:    Labs: A1c 6    IMAGING: No results found.    VASCULAR STUDIES: No results found.    LAST  WOUND CULTURE:  DATE : No results found for: CULTRSULT      ASSESSMENT AND PLAN:     1. Neuropathic ulcer of toe of left foot with fat layer exposed (HCC)  3/3/2023  - Patient with left 3rd toe ulcer covered with callus, does not probe to bone.  - Excisional debridement was performed in clinic, medically necessary to promote wound  healing.  - Will obtain Xray due to evidence of infection and purulence seen on examination  - Patient noted to have subtle hammer toe, discussed possibility of digital flexor tenotomy at future visit. Will need to rule out OM first  - Will need to discuss offloading next visit  - Return to clinic weekly for assessment and debridement as necessary   Wound Care: Hydrofera Blue Ready, tape    2. Wound infection  3/3/2023  - Swelling, erythema, pain of toe and wound  - Concern for infection, was able to express scant purulence  - Will obtain Xray  - Start empiric Abx with Augmentin and Doxycycline for 1 week  - Counseled to proceed to ED if worsening     3. Lower extremity edema  4. Venous insufficiency of both lower extremities    3/3/2023  - Patient has never been diagnosed and does not wear compression  - Has lower extremity edema worse throughout day, varicose veins, subtle hemosiderin staining consistent with venous insufficiency  - Will apply tubigrip today, may consider increasing next clinic visit. Has palpable pulses and denies claudication would likely tolerate compression  - Would benefit from compression lifelong, next visit will give handout to obtain compression socks.    5. Prediabetes  A1c 6    3/3/2023  - Patient does not check glucose and recently started metformin    6. Neuropathy    3/3/2023  - Reports he had previous workup, he is unsure of etiology  - Does not appear that he has ever been diagnosed with DM2, his A1c does not appear to have ever been in diabetic range  - Patient does endorse chronic back problems  - Implications of loss of protective sensation (LOPS) discussed with patient- including increased risk for amputation.  Advised to check feet at least daily, moisturize feet, and to always wear protective foot wear.  - Briefly discussed possibly obtaining custom neuropathic insoles and shoes when wound heals. Discussed unlikely to be covered by insurance as he does not currently have  diagnosis of DM    7. Obesity due to excess calories without serious comorbidity, unspecified classification  - Risk of impaired wound healing  - Discussed benefits of weight loss on reducing venous hypertension and reducing lower extremity edema.    PATIENT EDUCATION:  - Implications of loss of protective sensation (LOPS) discussed with patient- including increased risk for amputation.  - Advised to check feet at least daily, moisturize feet, and to always wear protective foot wear.   -  Importance of offloading foot to assist with wound healing  - Advised pt not to trim nails or calluses, seek foot/nail care from podiatrist or certified foot/nail nurse  - Importance of adequate nutrition for wound healing    My total time spent caring for the patient on the day of the encounter was 30 minutes.   This does not include time spent on separately billable procedures/tests.       Please note that this note may have been created using voice recognition software. I have worked with technical experts from [facility] to optimize the interface.  I have made every reasonable attempt to correct obvious errors, but there may be errors of grammar and possibly content that I did not discover before finalizing the note.    N

## 2023-03-04 NOTE — PATIENT INSTRUCTIONS
-Keep your wound dressing clean, dry, and intact.    -Change your dressing if it becomes soiled, soaked, or falls off.    -Should you experience any significant changes in your wound(s), such as infection (redness, swelling, localized heat, increased pain, fever > 101 F, chills) or have any questions regarding your home care instructions, please contact the wound center at [phone]. If after hours, contact your primary care physician or go to the hospital emergency room.

## 2023-03-04 NOTE — WOUND TEAM
Patient instructed to  abx as soon as possible, ordered today by provider. Patient also instructed to obtain x ray ordered by provider, prior to next week's visit.

## 2023-03-10 ENCOUNTER — OFFICE VISIT (OUTPATIENT)
Dept: WOUND CARE | Facility: MEDICAL CENTER | Age: 48
End: 2023-03-10
Attending: STUDENT IN AN ORGANIZED HEALTH CARE EDUCATION/TRAINING PROGRAM
Payer: MEDICAID

## 2023-03-10 DIAGNOSIS — I87.2 VENOUS INSUFFICIENCY OF BOTH LOWER EXTREMITIES: ICD-10-CM

## 2023-03-10 DIAGNOSIS — L08.9 WOUND INFECTION: ICD-10-CM

## 2023-03-10 DIAGNOSIS — L97.522 NEUROPATHIC ULCER OF TOE OF LEFT FOOT WITH FAT LAYER EXPOSED (HCC): ICD-10-CM

## 2023-03-10 DIAGNOSIS — T14.8XXA WOUND INFECTION: ICD-10-CM

## 2023-03-10 DIAGNOSIS — R73.03 PREDIABETES: ICD-10-CM

## 2023-03-10 DIAGNOSIS — G62.9 NEUROPATHY: ICD-10-CM

## 2023-03-10 DIAGNOSIS — R60.0 LOWER EXTREMITY EDEMA: ICD-10-CM

## 2023-03-10 DIAGNOSIS — E66.09 OBESITY DUE TO EXCESS CALORIES WITHOUT SERIOUS COMORBIDITY, UNSPECIFIED CLASSIFICATION: ICD-10-CM

## 2023-03-10 PROCEDURE — 11042 DBRDMT SUBQ TIS 1ST 20SQCM/<: CPT | Performed by: NURSE PRACTITIONER

## 2023-03-10 PROCEDURE — 11042 DBRDMT SUBQ TIS 1ST 20SQCM/<: CPT

## 2023-03-10 PROCEDURE — 99213 OFFICE O/P EST LOW 20 MIN: CPT | Mod: 25 | Performed by: NURSE PRACTITIONER

## 2023-03-10 PROCEDURE — 99213 OFFICE O/P EST LOW 20 MIN: CPT

## 2023-03-10 NOTE — PROGRESS NOTES
Provider Encounter- Neuropathic Foot Ulcer      HISTORY OF PRESENT ILLNESS  Wound History:    START OF CARE IN CLINIC: 2/23/2023    REFERRING PROVIDER:  Justin Bell DO     WOUND- Neuropathic foot ulcer   LOCATION: Left Disal 3rd Toe   HISTORY:  48M with PMHx of prediabetes, obesity, lower extremity edema. Patient presented to clinic with left distal 3rd toe wound and callus. He reports wound started around a month ago when trying on shoes. He was applying triple Abx ointment and bandaid to care at home. Patient also reports history of bad back and neuropathy. Patient was referred to [facility] for further care.     Pertinent Medical History: HTN, Obesity, DM2     Neuropathy HX: Etiology: possibly back problems.  Does  have numbness in feet.  Usually wears non Rx shoes. Does not check feet routinely.  Has not had previous foot ulcers or foot surgery.  Current occupation unemployed.  Offloading none.      TOBACCO USE:  Former smoker    Patient's problem list, allergies, and current medications reviewed and updated in Epic    Interval History:  3/3/2023: Clinic visit with Moustapha Barroso MD. Patient reports doing ok. Denies any fevers or chills. Reports some pain left distal 3rd toe. Not currently draining. His toe is swollen and erythematous. Patient reports difficult time finding appropriate shoes as his foot size is 16 extra wide. Patient also reports some chronic lower extremity edema with some varicose veins. He denies any previous diagnosis of venous insufficiency and does not wear compression. Patient denies any history of previous foot wounds. He does endorse some neuropathy of feet, previously worked up but does not know the etiology. He has history of prediabetes and recently started taking metformin.    3/10/2023 : Clinic visit with ALVARADO Peñaloza, FNP-BC, CWOCN, CFCN.  Patient states that overall he is feeling well.  His toe wound has again callused over.  He states that he has had problems with callus  to this toe for many years.  His foot is quite large, and thus finding shoes that fit is challenging.  He does have noticeable hammertoe deformity of this toe.  The joint is flexible.  He would likely benefit from tenotomy of this toe.  Unfortunately, not enough time allotted today.  We will have patient scheduled for a tenotomy in the next week or 2.         REVIEW OF SYSTEMS:   Unchanged from previous clinic visit on 3/3/2023, except as documented in interval history above    PHYSICAL EXAMINATION:   There were no vitals taken for this visit.    Physical Exam  Constitutional:       General: He is not in acute distress.     Appearance: He is obese.   Cardiovascular:      Rate and Rhythm: Normal rate.      Comments: Palpable pedal pulses  Pulmonary:      Effort: Pulmonary effort is normal. No respiratory distress.   Musculoskeletal:      Right lower leg: Edema present.      Left lower leg: Edema present.   Skin:     Comments: Left distal 3rd toe full thickness wound -wound is again covered with a thick callus.  Small full-thickness wound exposed with removal of callus, moderate serosanguineous drainage, no odor, no periwound erythema or induration    Refer to wound flowsheet and photos    Chronic bilateral lower extremity edema,varicose veins, and subtle hemosiderin staining -consistent with CVI.   Neurological:      General: No focal deficit present.      Mental Status: He is alert.   Psychiatric:         Mood and Affect: Mood normal.     Monofilament testing with a 10 gram force: sensation intact: decreased bilaterally  Visual Inspection: Feet with ulcer.  Pedal pulses: intact bilaterally      WOUND ASSESSMENT  Wound 02/23/23 Diabetic Ulcer Left distal 3rd toe (Active)   Wound Image    03/10/23 1500   Site Assessment Red;Other (Comment) 03/10/23 1500   Periwound Assessment Callused;Blanchable erythema;Edema 03/10/23 1500   Margins Unattached edges 03/10/23 1500   Closure Secondary intention 03/10/23 1500   Drainage  Amount Moderate 03/10/23 1500   Drainage Description Serosanguineous 03/10/23 1500   Treatments Cleansed;Topical Lidocaine;Provider debridement 03/10/23 1500   Wound Cleansing Normal Saline Irrigation 03/10/23 1500   Periwound Protectant Skin Protectant Wipes to Periwound 03/10/23 1500   Dressing Cleansing/Solutions Not Applicable 03/10/23 1500   Dressing Options Hydrofiber Silver;Nonadhesive Foam;Hypafix Tape;Tubigrip;Other (Comments) 03/10/23 1500   Dressing Changed Changed 03/10/23 1500   Dressing Change/Treatment Frequency Every 72 hrs, and As Needed 03/10/23 1500   Non-staged Wound Description Full thickness 03/10/23 1500   Wound Length (cm) 0.2 cm 03/10/23 1500   Wound Width (cm) 0.2 cm 03/10/23 1500   Wound Depth (cm) 0.1 cm 03/10/23 1500   Wound Surface Area (cm^2) 0.04 cm^2 03/10/23 1500   Wound Volume (cm^3) 0.004 cm^3 03/10/23 1500   Post-Procedure Length (cm) 0.3 cm 03/10/23 1500   Post-Procedure Width (cm) 0.7 cm 03/10/23 1500   Post-Procedure Depth (cm) 0.1 cm 03/10/23 1500   Post-Procedure Surface Area (cm^2) 0.21 cm^2 03/10/23 1500   Post-Procedure Volume (cm^3) 0.021 cm^3 03/10/23 1500   Wound Healing % 50 03/10/23 1500   Tunneling (cm) 0 cm 03/10/23 1500   Undermining (cm) 0 cm 03/10/23 1500   Wound Odor None 03/10/23 1500   Pulses Left;2+;DP;Doppler 02/23/23 1400   Right Foot Monofilament 10-point exam (Sensate) 5/10 02/23/23 1400   Left Foot Monofilament 10-point exam (Sensate) 3/10 02/23/23 1400   Exposed Structures None 03/10/23 1500   Number of days: 15           PROCEDURE: Excisional debridement of callus and ulcer to left distal distal third toe  -2% viscous lidocaine applied topically to wound bed for approximately 5 minutes prior to debridement  -Scalpel used to debride callus to skin level, exposing small full-thickness wound to distal toe.  Total area of callus debrided was approximately 2.0 cm²  -Scalpel then used to debride wound bed.  Excisional debridement was performed to remove  devitalized tissue until healthy, bleeding tissue was visualized.   Entire surface of wound, 0.21 cm² debrided.  Tissue debrided into the subcutaneous layer.    -Bleeding controlled with manual pressure.    -Wound care completed by wound RN, refer to flowsheet   -Patient tolerated the procedure well, without c/o pain or discomfort.       Pertinent Labs and Diagnostics:    Labs: A1c 6    IMAGING: No results found.    VASCULAR STUDIES: No results found.    LAST  WOUND CULTURE:  DATE : No results found for: ALANIS      ASSESSMENT AND PLAN:     1. Neuropathic ulcer of toe of left foot with fat layer exposed (HCC)  3/10/2023: Callus continues to build up in between visits, creating pressure to underlying wound  - Excisional debridement of callus and wound was performed in clinic, medically necessary to promote wound healing.  -Patient has hammertoe deformity with flexible PIP joint.  Would likely benefit from tenotomy of this toe.  We will schedule patient for a longer appointment for this procedure  - Return to clinic weekly for assessment and debridement as necessary   Wound Care: Hydrofera Blue Ready, tape    2. Wound infection  3/10/2023: Patient was prescribed Augmentin and doxycycline last visit, should be completing these in the next day or so.  No evidence of wound infection today  -Monitor for signs and symptoms of infection each clinic visit  -X-ray ordered last visit to assess for OM, patient has not yet completed.  Reminded to do so.    3. Lower extremity edema  4. Venous insufficiency of both lower extremities    3/10/2023: Edema somewhat improved with Tubigrip  - Patient has never been diagnosed and does not wear compression  - Has lower extremity edema worse throughout day, varicose veins, subtle hemosiderin staining consistent with venous insufficiency  -Continue with tubigrip.  Consider multilayer compression wrap for better management of edema  - Would benefit from compression lifelong, next visit  will give handout to obtain compression socks.    5. Prediabetes  A1c 6    3/10/2023: Recently diagnosed.  Patient is taking metformin, does not check his blood sugars    6. Neuropathy    3/10/2023: Etiology unclear.  Patient states he had a recent work-up  - Does not appear that he has ever been diagnosed with DM2, his A1c does not appear to have ever been in diabetic range  - Patient does endorse chronic back problems  - Implications of loss of protective sensation (LOPS) previously discussed with patient- including increased risk for amputation.  Advised to check feet at least daily, moisturize feet, and to always wear protective foot wear.      7. Obesity due to excess calories without serious comorbidity, unspecified classification  -Complicating factor, risk of impaired wound healing  -Benefits of weight loss to reduce lower extremity hypertension previously discussed        My total time spent caring for the patient on the day of the encounter was 20 minutes.   This does not include time spent on separately billable procedures/tests.       Please note that this note may have been created using voice recognition software. I have worked with technical experts from [employer] to optimize the interface.  I have made every reasonable attempt to correct obvious errors, but there may be errors of grammar and possibly content that I did not discover before finalizing the note.    N

## 2023-03-10 NOTE — PATIENT INSTRUCTIONS
-Keep your wound dressing clean, dry, and intact.    -Change your dressing every 3 to 4 days and if it becomes soiled, soaked, or falls off.    -Should you experience any significant changes in your wound(s), such as infection (redness, swelling, localized heat, increased pain, fever > 101 F, chills) or have any questions regarding your home care instructions, please contact the wound center at [phone]. If after hours, contact your primary care physician or go to the hospital emergency room.

## 2023-03-14 ENCOUNTER — APPOINTMENT (OUTPATIENT)
Dept: RADIOLOGY | Facility: MEDICAL CENTER | Age: 48
End: 2023-03-14
Attending: STUDENT IN AN ORGANIZED HEALTH CARE EDUCATION/TRAINING PROGRAM
Payer: MEDICAID

## 2023-03-14 DIAGNOSIS — T14.8XXA WOUND INFECTION: ICD-10-CM

## 2023-03-14 DIAGNOSIS — L97.522 NEUROPATHIC ULCER OF TOE OF LEFT FOOT WITH FAT LAYER EXPOSED (HCC): ICD-10-CM

## 2023-03-14 DIAGNOSIS — L08.9 WOUND INFECTION: ICD-10-CM

## 2023-03-14 PROCEDURE — 73630 X-RAY EXAM OF FOOT: CPT | Mod: LT

## 2023-03-16 ENCOUNTER — OFFICE VISIT (OUTPATIENT)
Dept: WOUND CARE | Facility: MEDICAL CENTER | Age: 48
End: 2023-03-16
Attending: STUDENT IN AN ORGANIZED HEALTH CARE EDUCATION/TRAINING PROGRAM
Payer: MEDICAID

## 2023-03-16 VITALS
DIASTOLIC BLOOD PRESSURE: 87 MMHG | RESPIRATION RATE: 20 BRPM | OXYGEN SATURATION: 92 % | SYSTOLIC BLOOD PRESSURE: 121 MMHG | TEMPERATURE: 96.9 F | HEART RATE: 99 BPM

## 2023-03-16 DIAGNOSIS — T14.8XXA WOUND INFECTION: ICD-10-CM

## 2023-03-16 DIAGNOSIS — R60.0 LOWER EXTREMITY EDEMA: ICD-10-CM

## 2023-03-16 DIAGNOSIS — R73.03 PREDIABETES: ICD-10-CM

## 2023-03-16 DIAGNOSIS — L08.9 WOUND INFECTION: ICD-10-CM

## 2023-03-16 DIAGNOSIS — I87.2 VENOUS INSUFFICIENCY OF BOTH LOWER EXTREMITIES: ICD-10-CM

## 2023-03-16 DIAGNOSIS — G62.9 NEUROPATHY: ICD-10-CM

## 2023-03-16 DIAGNOSIS — L97.522 NEUROPATHIC ULCER OF TOE OF LEFT FOOT WITH FAT LAYER EXPOSED (HCC): Primary | ICD-10-CM

## 2023-03-16 PROCEDURE — 11042 DBRDMT SUBQ TIS 1ST 20SQCM/<: CPT

## 2023-03-16 PROCEDURE — 11042 DBRDMT SUBQ TIS 1ST 20SQCM/<: CPT | Performed by: NURSE PRACTITIONER

## 2023-03-16 NOTE — PROGRESS NOTES
Provider Encounter- Neuropathic Foot Ulcer      HISTORY OF PRESENT ILLNESS  Wound History:   \START OF CARE IN CLINIC: 2/23/2023   REFERRING PROVIDER:  Justin Bell DO  WOUND- Neuropathic foot ulcer   LOCATION: Left Disal 3rd Toe   HISTORY:  48M with PMHx of prediabetes, obesity, lower extremity edema. Patient presented to clinic with left distal 3rd toe wound and callus. He reports wound started around a month ago when trying on shoes. He was applying triple Abx ointment and bandaid to care at home. Patient also reports history of bad back and neuropathy. Patient was referred to [facility] for further care.     Pertinent Medical History: HTN, Obesity, DM2     Neuropathy HX: Etiology: possibly back problems.  Does  have numbness in feet.  Usually wears non Rx shoes. Does not check feet routinely.  Has not had previous foot ulcers or foot surgery.  Current occupation unemployed.  Offloading none.      TOBACCO USE:  Former smoker    Patient's problem list, allergies, and current medications reviewed and updated in Epic    Interval History:  3/3/2023: Clinic visit with Moustapha Barroso MD. Patient reports doing ok. Denies any fevers or chills. Reports some pain left distal 3rd toe. Not currently draining. His toe is swollen and erythematous. Patient reports difficult time finding appropriate shoes as his foot size is 16 extra wide. Patient also reports some chronic lower extremity edema with some varicose veins. He denies any previous diagnosis of venous insufficiency and does not wear compression. Patient denies any history of previous foot wounds. He does endorse some neuropathy of feet, previously worked up but does not know the etiology. He has history of prediabetes and recently started taking metformin.    3/10/2023 : Clinic visit with ALVARADO Peñaloza, FNP-BC, CWOCN, CFCN.  Patient states that overall he is feeling well.  His toe wound has again callused over.  He states that he has had problems with callus to  this toe for many years.  His foot is quite large, and thus finding shoes that fit is challenging.  He does have noticeable hammertoe deformity of this toe.  The joint is flexible.  He would likely benefit from tenotomy of this toe.  Unfortunately, not enough time allotted today.  We will have patient scheduled for a tenotomy in the next week or 2.      3/16/2023: Clinic visit with ALVARADO Knox.  Patient reports overall he is doing well and denies any fever or chills.  Patient's wound is progressing nicely thin layer of callus removed to the distal aspect of the left distal third toe.  There is a small pinpoint opening near resolution.  Patient is to return to the clinic next week for tenotomy by Dr. Barroso.          REVIEW OF SYSTEMS:   Unchanged from previous clinic visit on 3/10/2023, except as documented in interval history above    PHYSICAL EXAMINATION:   /87   Pulse 99   Temp 36.1 °C (96.9 °F) (Temporal)   Resp 20   SpO2 92%     Physical Exam  Constitutional:       General: He is not in acute distress.     Appearance: He is obese.   Cardiovascular:      Rate and Rhythm: Normal rate.      Comments: Palpable pedal pulses  Pulmonary:      Effort: Pulmonary effort is normal. No respiratory distress.   Musculoskeletal:      Right lower leg: Edema present.      Left lower leg: Edema present.   Skin:     Comments: Left distal 3rd toe full thickness wound -thin layer of callus removed from the distal aspect of the toe.  Small pin hole wound remains near resolution.      Refer to wound flowsheet and photos    Chronic bilateral lower extremity edema,varicose veins, and subtle hemosiderin staining -consistent with CVI.   Neurological:      General: No focal deficit present.      Mental Status: He is alert.   Psychiatric:         Mood and Affect: Mood normal.     Monofilament testing with a 10 gram force: sensation intact: decreased bilaterally  Visual Inspection: Feet with ulcer.  Pedal pulses:  intact bilaterally      WOUND ASSESSMENT  Wound 02/23/23 Diabetic Ulcer Left distal 3rd toe (Active)   Wound Image    03/10/23 1500   Site Assessment Other (Comment) 03/16/23 1522   Periwound Assessment Callused 03/16/23 1522   Margins Attached edges 03/16/23 1522   Closure Secondary intention 03/10/23 1500   Drainage Amount Small 03/16/23 1522   Drainage Description Serosanguineous 03/16/23 1522   Treatments Cleansed;Topical Lidocaine;Provider debridement 03/16/23 1522   Wound Cleansing Puracyn Spray 03/16/23 1522   Periwound Protectant Skin Protectant Wipes to Periwound;Barrier Paste 03/16/23 1522   Dressing Cleansing/Solutions Not Applicable 03/10/23 1500   Dressing Options Hydrofiber Silver;Nonadhesive Foam;Hypafix Tape;Tubigrip;Other (Comments) 03/10/23 1500   Dressing Changed Changed 03/10/23 1500   Dressing Change/Treatment Frequency Every 72 hrs, and As Needed 03/10/23 1500   Non-staged Wound Description Full thickness 03/10/23 1500   Wound Length (cm) 0.2 cm 03/10/23 1500   Wound Width (cm) 0.2 cm 03/10/23 1500   Wound Depth (cm) 0.1 cm 03/10/23 1500   Wound Surface Area (cm^2) 0.04 cm^2 03/10/23 1500   Wound Volume (cm^3) 0.004 cm^3 03/10/23 1500   Post-Procedure Length (cm) 0.3 cm 03/10/23 1500   Post-Procedure Width (cm) 0.7 cm 03/10/23 1500   Post-Procedure Depth (cm) 0.1 cm 03/10/23 1500   Post-Procedure Surface Area (cm^2) 0.21 cm^2 03/10/23 1500   Post-Procedure Volume (cm^3) 0.021 cm^3 03/10/23 1500   Wound Healing % 50 03/10/23 1500   Tunneling (cm) 0 cm 03/10/23 1500   Undermining (cm) 0 cm 03/10/23 1500   Wound Odor None 03/10/23 1500   Pulses Left;2+;DP;Doppler 02/23/23 1400   Right Foot Monofilament 10-point exam (Sensate) 5/10 02/23/23 1400   Left Foot Monofilament 10-point exam (Sensate) 3/10 02/23/23 1400   Exposed Structures None 03/10/23 1500   Number of days: 21           PROCEDURE: Excisional debridement of callus and ulcer to left distal distal third toe   -2% viscous lidocaine applied  topically to wound bed for approximately 5 minutes prior to debridement  -Curette used to debride wound bed.  Excisional debridement was performed to remove devitalized tissue until healthy, bleeding tissue was visualized.   Less than 20cm2 debrided.  Tissue debrided into the subcutaneous layer.    -Bleeding controlled with manual pressure.    -Wound care completed by wound RN, refer to flowsheet  -Patient tolerated the procedure well, without c/o pain or discomfort.        Pertinent Labs and Diagnostics:    Labs: A1c 6    IMAGING: No results found.    VASCULAR STUDIES: No results found.    LAST  WOUND CULTURE:  DATE : No results found for: ALANIS      ASSESSMENT AND PLAN:     1. Neuropathic ulcer of toe of left foot with fat layer exposed (HCC)  3/16/2023: Thin layer of callus built up over the wound bed however less than previous clinic visits.   -Excisional debridement performed to remove callus and nonviable tissue  -Patient has hammertoe deformity with flexible PIP joint.  Patient is to undergo tenotomy with Dr. Barroso  -Patient to return to the clinic next week for tenotomy and assessment of the wound bed and periwound callus  -Patient given a prescription for orthotic shoes and inserts from [facility] patient is to go to [facility] and discuss options with Carlos Eduardo regarding proper shoe size and toebox width and length   Wound Care: Hydrofera Blue Ready, Hypafix tape    2. Wound infection  3/16/2023:  -No acute signs or symptoms of infection  -Continue to monitor for signs and symptoms of infection at each additional visit.   -X-ray completed on 3/14/2023 was negative for bony destruction or fracture    3. Lower extremity edema  4. Venous insufficiency of both lower extremities    3/16/2023:   -Patient has a significant amount of bilateral lower extremity edema.  Suspect this is multifactorial.  - Has lower extremity edema worse throughout day, varicose veins, subtle hemosiderin staining consistent with  venous insufficiency  -Discussed with patient ordering of compression stockings. Please give handout for compression stockings.       5. Prediabetes  A1c 6    3/16/2023: Recently diagnosed.  Patient is taking metformin, does not check his blood sugars    6. Neuropathy    3/16/2023: Etiology unclear.  Patient states he had a recent work-up  - Does not appear that he has ever been diagnosed with DM2, his A1c does not appear to have ever been in diabetic range  - Patient does endorse chronic back problems  - Implications of loss of protective sensation (LOPS) previously discussed with patient- including increased risk for amputation.  Advised to check feet at least daily, moisturize feet, and to always wear protective foot wear.      7. Obesity due to excess calories without serious comorbidity, unspecified classification  -Complicating factor, risk of impaired wound healing  -Benefits of weight loss to reduce lower extremity hypertension previously discussed         Please note that this note may have been created using voice recognition software. I have worked with technical experts from [employer] to optimize the interface.  I have made every reasonable attempt to correct obvious errors, but there may be errors of grammar and possibly content that I did not discover before finalizing the note.    N

## 2023-03-16 NOTE — PATIENT INSTRUCTIONS
-Keep dressings clean and dry. Change dressings every 3-4 days, and if the dressings become saturated, soiled, or fall off.    -Avoid prolonged standing or sitting without elevating your legs.    -Never walk around the house barefoot. Always wear a rubber soled slipper when walking around the house.    -Should you experience any significant changes in your wound(s), such as signs of infection (increasing redness, swelling, localized heat, increased pain, fever > 101 F, chills) or have any questions regarding your home care instructions, please contact the wound center at [phone]. If after hours, contact your primary care physician or go to the hospital emergency room.     -If you are 5 or more minutes late for an appointment, we reserve the right to cancel and reschedule that appointment. Additionally, if you are habitually late or not showing (3 late cancellations and/or no shows), we reserve the right to cancel your remaining appointments and it will be your responsibility to obtain a new referral if services are still needed.

## 2023-03-16 NOTE — PROGRESS NOTES
Patient given prescription for custom shoes and inserts from  orthotics on [facility]. Patient instructed to begin the process for these shoes as soon as he is able, it will take at least 4 weeks for shoes to be made.     Patient scheduled for 60 minutes next week (03/24/23) for tenotomy of left 3rd toe.

## 2023-03-24 ENCOUNTER — NON-PROVIDER VISIT (OUTPATIENT)
Dept: WOUND CARE | Facility: MEDICAL CENTER | Age: 48
End: 2023-03-24
Attending: STUDENT IN AN ORGANIZED HEALTH CARE EDUCATION/TRAINING PROGRAM
Payer: MEDICAID

## 2023-03-24 PROCEDURE — 99211 OFF/OP EST MAY X REQ PHY/QHP: CPT

## 2023-03-24 NOTE — PROGRESS NOTES
3/24/2023: No wound procedures today, wound is resolved. Patient states that he is still interested in having the tenotomy procedure on his left 3rd toe. Patient scheduled with Dr. Barroso 3/29/2023 at 3:00pm.

## 2023-03-29 ENCOUNTER — OFFICE VISIT (OUTPATIENT)
Dept: WOUND CARE | Facility: MEDICAL CENTER | Age: 48
End: 2023-03-29
Attending: STUDENT IN AN ORGANIZED HEALTH CARE EDUCATION/TRAINING PROGRAM
Payer: MEDICAID

## 2023-03-29 VITALS
OXYGEN SATURATION: 95 % | TEMPERATURE: 98.9 F | DIASTOLIC BLOOD PRESSURE: 116 MMHG | RESPIRATION RATE: 18 BRPM | HEART RATE: 91 BPM | SYSTOLIC BLOOD PRESSURE: 160 MMHG

## 2023-03-29 DIAGNOSIS — I87.2 VENOUS INSUFFICIENCY OF BOTH LOWER EXTREMITIES: ICD-10-CM

## 2023-03-29 DIAGNOSIS — L08.9 WOUND INFECTION: ICD-10-CM

## 2023-03-29 DIAGNOSIS — R60.0 LOWER EXTREMITY EDEMA: ICD-10-CM

## 2023-03-29 DIAGNOSIS — R73.03 PREDIABETES: ICD-10-CM

## 2023-03-29 DIAGNOSIS — E66.09 OBESITY DUE TO EXCESS CALORIES WITHOUT SERIOUS COMORBIDITY, UNSPECIFIED CLASSIFICATION: ICD-10-CM

## 2023-03-29 DIAGNOSIS — T14.8XXA WOUND INFECTION: ICD-10-CM

## 2023-03-29 DIAGNOSIS — L97.522 NEUROPATHIC ULCER OF TOE OF LEFT FOOT WITH FAT LAYER EXPOSED (HCC): ICD-10-CM

## 2023-03-29 DIAGNOSIS — G62.9 NEUROPATHY: ICD-10-CM

## 2023-03-29 PROCEDURE — 99212 OFFICE O/P EST SF 10 MIN: CPT

## 2023-03-29 PROCEDURE — 99212 OFFICE O/P EST SF 10 MIN: CPT | Performed by: STUDENT IN AN ORGANIZED HEALTH CARE EDUCATION/TRAINING PROGRAM

## 2023-03-29 ASSESSMENT — PAIN SCALES - GENERAL: PAINLEVEL: NO PAIN

## 2023-03-29 NOTE — PROGRESS NOTES
Patient scheduled for tenotomy today. Per  assessment, patient does not need procedure at this time. Patient discharged at this time secondary to wound resolution.

## 2023-03-29 NOTE — PATIENT INSTRUCTIONS
-Keep your wound dressing clean, dry, and intact.    -Change your dressing if it becomes soiled, soaked, or falls off.    - Resolved wound be fragile for a few days, bathe and dry area gently, only ever regains a maximum of 80% of the tensile strength of the surrounding skin, remodeling of scar can continue for 6mo - a year. Contact PCP for a referral back to wound care if any problems with area opening and draining again.    -Should you experience any significant changes in your wound(s), such as infection (redness, swelling, localized heat, increased pain, fever > 101 F, chills) or have any questions regarding your home care instructions, please contact the wound center at [phone]. If after hours, contact your primary care physician or go to the hospital emergency room.

## 2023-03-30 NOTE — PROGRESS NOTES
Provider Encounter- Neuropathic Foot Ulcer      HISTORY OF PRESENT ILLNESS  Wound History:   \START OF CARE IN CLINIC: 2/23/2023   REFERRING PROVIDER:  Justin Bell DO  WOUND- Neuropathic foot ulcer   LOCATION: Left Disal 3rd Toe   HISTORY:  48M with PMHx of prediabetes, obesity, lower extremity edema. Patient presented to clinic with left distal 3rd toe wound and callus. He reports wound started around a month ago when trying on shoes. He was applying triple Abx ointment and bandaid to care at home. Patient also reports history of bad back and neuropathy. Patient was referred to [facility] for further care.     Pertinent Medical History: HTN, Obesity, DM2     Neuropathy HX: Etiology: possibly back problems.  Does  have numbness in feet.  Usually wears non Rx shoes. Does not check feet routinely.  Has not had previous foot ulcers or foot surgery.  Current occupation unemployed.  Offloading none.      TOBACCO USE:  Former smoker    Patient's problem list, allergies, and current medications reviewed and updated in Epic    Interval History:  3/3/2023: Clinic visit with Moustapha Barroso MD. Patient reports doing ok. Denies any fevers or chills. Reports some pain left distal 3rd toe. Not currently draining. His toe is swollen and erythematous. Patient reports difficult time finding appropriate shoes as his foot size is 16 extra wide. Patient also reports some chronic lower extremity edema with some varicose veins. He denies any previous diagnosis of venous insufficiency and does not wear compression. Patient denies any history of previous foot wounds. He does endorse some neuropathy of feet, previously worked up but does not know the etiology. He has history of prediabetes and recently started taking metformin.    3/10/2023 : Clinic visit with ALVARADO Peñaloza, FNP-BC, CWOCN, CFCN.  Patient states that overall he is feeling well.  His toe wound has again callused over.  He states that he has had problems with callus to  this toe for many years.  His foot is quite large, and thus finding shoes that fit is challenging.  He does have noticeable hammertoe deformity of this toe.  The joint is flexible.  He would likely benefit from tenotomy of this toe.  Unfortunately, not enough time allotted today.  We will have patient scheduled for a tenotomy in the next week or 2.      3/16/2023: Clinic visit with ALVARADO Knox.  Patient reports overall he is doing well and denies any fever or chills.  Patient's wound is progressing nicely thin layer of callus removed to the distal aspect of the left distal third toe.  There is a small pinpoint opening near resolution.  Patient is to return to the clinic next week for tenotomy by Dr. Barroso.      3/29/2023: Clinic visit with Moustapha Barroso MD. Patient wound has resolved. He was scheduled for tenotomy today but after evaluating toe, has great ROM without significant hammer toe deformity. Over past few weeks no significant buildup of callus. Discussed procedure vs watchful waiting and focusing on obtaining custom shoes and insoles. He would like conservative care and would prefer to avoid tenotomy today. As wounds have resolved he will be discharged from clinic.    REVIEW OF SYSTEMS:   Unchanged from previous clinic visit on 3/16/2023, except as documented in interval history above    PHYSICAL EXAMINATION:   BP (!) 160/116   Pulse 91   Temp 37.2 °C (98.9 °F)   Resp 18   SpO2 95%     Physical Exam  Constitutional:       General: He is not in acute distress.     Appearance: He is obese.   Cardiovascular:      Rate and Rhythm: Normal rate.      Comments: Palpable pedal pulses  Pulmonary:      Effort: Pulmonary effort is normal. No respiratory distress.   Musculoskeletal:      Right lower leg: Edema present.      Left lower leg: Edema present.   Skin:     Comments: Left distal 3rd toe full thickness wound - Resolved. No significant callus. Toe does not appear to be hammer toe.  Refer to  wound flowsheet and photos    Chronic bilateral lower extremity edema,varicose veins, and subtle hemosiderin staining -consistent with CVI.   Neurological:      General: No focal deficit present.      Mental Status: He is alert.   Psychiatric:         Mood and Affect: Mood normal.     Monofilament testing with a 10 gram force: sensation intact: decreased bilaterally  Visual Inspection: Feet with ulcer.  Pedal pulses: intact bilaterally      WOUND ASSESSMENT         Wound resolved without callus      PROCEDURE:   - No debridement required today in clinic      Pertinent Labs and Diagnostics:    Labs: A1c 6    IMAGING: No results found.    VASCULAR STUDIES: No results found.    LAST  WOUND CULTURE:  DATE : No results found for: CULTRSULT      ASSESSMENT AND PLAN:     1. Neuropathic ulcer of toe of left foot with fat layer exposed (HCC)  3/29/2023: Toe wound has resolved. No significant callus formation.  -No procedure required at this time  - Hammer toe deformity does not appear fixed and has very good ROM of toe. At this time he would like to avoid tenotomy.  - Has Rx for custom shoes an inserts. He is saving up money to purchase.  Recommend he discuss options with Carlos Eduardo regarding proper shoe size and toebox width and length  - Will discharge from clinic due to wound resolution.   Wound Care: Open to Air    2. Wound infection  3/29/2023:  -No acute signs or symptoms of infection  -X-ray completed on 3/14/2023 was negative for bony destruction or fracture    3. Lower extremity edema  4. Venous insufficiency of both lower extremities    3/29/2023:   -Patient has a significant amount of bilateral lower extremity edema.  Suspect this is multifactorial.  - Has lower extremity edema worse throughout day, varicose veins, subtle hemosiderin staining consistent with venous insufficiency  -Discussed with patient ordering of compression stockings. Please give handout for compression stockings, he has yet to obtain.    5.  Prediabetes  A1c 6    3/29/2023: Recently diagnosed.  Patient is taking metformin, does not check his blood sugars    6. Neuropathy    3/29/2023: Etiology unclear.  Patient states he had a recent work-up  - Does not appear that he has ever been diagnosed with DM2, his A1c does not appear to have ever been in diabetic range  - Patient does endorse chronic back problems  - Implications of loss of protective sensation (LOPS) previously discussed with patient- including increased risk for amputation.  Advised to check feet at least daily, moisturize feet, and to always wear protective foot wear.      7. Obesity due to excess calories without serious comorbidity, unspecified classification  -Complicating factor, risk of impaired wound healing  -Benefits of weight loss to reduce lower extremity hypertension previously discussed         Please note that this note may have been created using voice recognition software. I have worked with technical experts from [employer] to optimize the interface.  I have made every reasonable attempt to correct obvious errors, but there may be errors of grammar and possibly content that I did not discover before finalizing the note.    N

## 2023-03-31 ENCOUNTER — APPOINTMENT (OUTPATIENT)
Dept: WOUND CARE | Facility: MEDICAL CENTER | Age: 48
End: 2023-03-31
Attending: STUDENT IN AN ORGANIZED HEALTH CARE EDUCATION/TRAINING PROGRAM
Payer: MEDICAID

## 2023-05-04 DIAGNOSIS — R73.03 PREDIABETES: ICD-10-CM

## 2023-07-30 DIAGNOSIS — R73.03 PREDIABETES: ICD-10-CM

## 2024-03-06 ENCOUNTER — RESEARCH ENCOUNTER (OUTPATIENT)
Dept: MEDICAL GROUP | Facility: CLINIC | Age: 49
End: 2024-03-06
Payer: MEDICAID

## 2024-03-06 ENCOUNTER — HOSPITAL ENCOUNTER (OUTPATIENT)
Facility: MEDICAL CENTER | Age: 49
End: 2024-03-06
Payer: MEDICAID

## 2024-03-06 ENCOUNTER — OFFICE VISIT (OUTPATIENT)
Dept: MEDICAL GROUP | Facility: CLINIC | Age: 49
End: 2024-03-06
Payer: MEDICAID

## 2024-03-06 VITALS
BODY MASS INDEX: 36.45 KG/M2 | WEIGHT: 315 LBS | SYSTOLIC BLOOD PRESSURE: 140 MMHG | OXYGEN SATURATION: 95 % | RESPIRATION RATE: 16 BRPM | TEMPERATURE: 97 F | HEIGHT: 78 IN | DIASTOLIC BLOOD PRESSURE: 92 MMHG | HEART RATE: 91 BPM

## 2024-03-06 DIAGNOSIS — Z00.6 RESEARCH STUDY PATIENT: ICD-10-CM

## 2024-03-06 DIAGNOSIS — R73.03 PREDIABETES: ICD-10-CM

## 2024-03-06 DIAGNOSIS — G89.29 CHRONIC PAIN OF RIGHT KNEE: ICD-10-CM

## 2024-03-06 DIAGNOSIS — Z13.79 GENETIC SCREENING: ICD-10-CM

## 2024-03-06 DIAGNOSIS — H53.8 VISION BLURRING: ICD-10-CM

## 2024-03-06 DIAGNOSIS — M25.561 CHRONIC PAIN OF RIGHT KNEE: ICD-10-CM

## 2024-03-06 DIAGNOSIS — E78.5 HYPERLIPIDEMIA, UNSPECIFIED HYPERLIPIDEMIA TYPE: ICD-10-CM

## 2024-03-06 PROBLEM — E11.9 T2DM (TYPE 2 DIABETES MELLITUS) (HCC): Status: ACTIVE | Noted: 2023-02-06

## 2024-03-06 LAB
HBA1C MFR BLD: 6 % (ref ?–5.8)
POCT INT CON NEG: NEGATIVE
POCT INT CON POS: POSITIVE

## 2024-03-06 PROCEDURE — 99214 OFFICE O/P EST MOD 30 MIN: CPT | Mod: GC

## 2024-03-06 PROCEDURE — 82570 ASSAY OF URINE CREATININE: CPT

## 2024-03-06 PROCEDURE — 82043 UR ALBUMIN QUANTITATIVE: CPT

## 2024-03-06 PROCEDURE — 3077F SYST BP >= 140 MM HG: CPT | Mod: GC

## 2024-03-06 PROCEDURE — 83036 HEMOGLOBIN GLYCOSYLATED A1C: CPT | Mod: GC

## 2024-03-06 PROCEDURE — 3080F DIAST BP >= 90 MM HG: CPT | Mod: GC

## 2024-03-06 RX ORDER — IBUPROFEN 800 MG/1
800 TABLET ORAL EVERY 8 HOURS PRN
Qty: 90 TABLET | Refills: 3 | Status: SHIPPED | OUTPATIENT
Start: 2024-03-06

## 2024-03-06 RX ORDER — SEMAGLUTIDE 0.68 MG/ML
0.5 INJECTION, SOLUTION SUBCUTANEOUS
Qty: 3 ML | Refills: 11 | Status: SHIPPED | OUTPATIENT
Start: 2024-03-06

## 2024-03-06 RX ORDER — ROSUVASTATIN CALCIUM 5 MG/1
5 TABLET, COATED ORAL EVERY EVENING
Qty: 30 TABLET | Refills: 11 | Status: SHIPPED | OUTPATIENT
Start: 2024-03-06

## 2024-03-06 ASSESSMENT — PATIENT HEALTH QUESTIONNAIRE - PHQ9: CLINICAL INTERPRETATION OF PHQ2 SCORE: 0

## 2024-03-07 LAB
CREAT UR-MCNC: 127.14 MG/DL
MICROALBUMIN UR-MCNC: 6.2 MG/DL
MICROALBUMIN/CREAT UR: 49 MG/G (ref 0–30)

## 2024-03-07 NOTE — PROGRESS NOTES
SUBJECTIVE:     CC: refill medication, diabetes checkup, discuss options for steroid knee injections for chronic osteoarthritis, and request referral to see an optometrist    HPI:   Fernandez presents today with:    Problem   Vision Blurring    States that his vision has been worsening during nighttime driving.  Requests a referral to his optometrist, which she requires from the PCP.     Hyperlipidemia    Patient's most recent blood work shows lipid values with increased total cholesterol and LDL.  Patient will be given on a high intensity statin.     T2dm (Type 2 Diabetes Mellitus) ([facility])    Patient's diabetes is under control currently on metformin 500 mg twice daily.  His point-of-care A1c today is 6.  Not taking blood sugars daily but labs show that his blood glucose levels are controlled in the 100s level.  Filament test performed with bilateral feet; scored 3 out of 4 feeling in both feet.  Patient reports eliminating soda from his diet about 1 year ago and has lost 50 pounds since.  Patient is interested in losing more weight to help his keep his diabetes under control.     Chronic Pain of Right Knee    Patient has suffered from knee pain bilaterally for the majority of his adult life.  There are times when patient cannot walk because the pain is so bad.  He has only been taking ibuprofen 800mg PRN when it hurts, about 5-6 times per week.  He is not currently icing.   Would like to proceed with steroid joint injection that he had performed in January 2023 with great relief.            Past Medical History:  Past Medical History:   Diagnosis Date    Congestive heart failure (HCC)     Hypertension     Pain     abdominal pain from hernia       Patient Active Problem List   Diagnosis    [place] syndrome (geniculate herpes zoster)    Impacted cerumen of right ear    Hearing loss of right ear    Chronic pain of right knee    BMI 40.0-44.9, adult ([facility])    Need for vaccination    Screening for colon cancer    BMI  31.0-31.9,adult    Chronic pain of left knee    Encounter for HCV screening test for low risk patient    Screening for HIV (human immunodeficiency virus)    T2DM (type 2 diabetes mellitus) (HCC)    Diabetic polyneuropathy associated with type 2 diabetes mellitus (HCC)    Diabetic ulcer of toe associated with type 2 diabetes mellitus (HCC)    Vision blurring    Hyperlipidemia       Surgical History:  Past Surgical History:   Procedure Laterality Date    INGUINAL HERNIA LAPAROSCOPIC Right 2019    Procedure: REPAIR, HERNIA, INGUINAL, LAPAROSCOPIC;  Surgeon: John H Ganser, M.D.;  Location: SURGERY [facility];  Service: General    KNEE REPLACEMENT, TOTAL Left     KNEE REPLACEMENT, TOTAL Right        Family History:  No family history on file.    Social History:  Social History     Tobacco Use    Smoking status: Former     Current packs/day: 0.00     Average packs/day: 1 pack/day for 22.0 years (22.0 ttl pk-yrs)     Types: Cigarettes     Start date:      Quit date:      Years since quittin.1    Smokeless tobacco: Never   Vaping Use    Vaping Use: Never used   Substance Use Topics    Alcohol use: No    Drug use: No       Medications:  Current Outpatient Medications on File Prior to Visit   Medication Sig Dispense Refill    losartan (COZAAR) 50 MG Tab Take 1 Tablet by mouth every day. 30 Tablet 1    hydroCHLOROthiazide (HYDRODIURIL) 25 MG Tab       carvedilol (COREG) 6.25 MG Tab Take 1 Tablet by mouth 2 times a day with meals.      gabapentin (NEURONTIN) 300 MG Cap Take 1 Capsule by mouth at bedtime as needed (pain). 90 Capsule 3     No current facility-administered medications on file prior to visit.       No Known Allergies      ROS:   Gen: no fevers/chills, no changes in weight  Eyes: no changes in vision  ENT: no changes in hearing  Pulm: no sob, no cough  CV: no chest pain, no palpitations  GI: no nausea/vomiting, no diarrhea  MSk: no myalgias  Skin: no rash  Neuro: no headaches, no  "numbness/tingling      OBJECTIVE:     Exam:  BP (!) 140/92 (BP Location: Right arm, Patient Position: Sitting, BP Cuff Size: Large adult)   Pulse 91   Temp 36.1 °C (97 °F) (Temporal)   Resp 16   Ht 2.032 m (6' 8\")   Wt (!) 173 kg (380 lb 9.6 oz)   SpO2 95%   BMI 41.81 kg/m²  Body mass index is 41.81 kg/m².    Gen: Alert and oriented, No apparent distress. Morbidly obese habitus.  Head:  NCAT, EOMI, sclera clear without discharge  Neck: Neck is supple without lymphadenopathy.  Lungs: Normal effort, CTA bilaterally, no wheezes, rhonchi, or rales  CV: Regular rate and rhythm. No murmurs, rubs, or gallops.  Abd:   Non-distended, soft  Ext: No clubbing, cyanosis, edema.  MSK: Unassisted gait  Derm: No lesions on exposed skin  Psych: normal mood and affect    Labs:  Reviewed.  POCT A1C is 6.0    ASSESSMENT & PLAN:     49 y.o. male with the following -    Problem List Items Addressed This Visit       Chronic pain of right knee     Plan:  - Refill ibuprofen 800 mg  - Patient will schedule follow-up appointment for Depo-Medrol injection of bilateral knees         Relevant Medications    ibuprofen (MOTRIN) 800 MG Tab    T2DM (type 2 diabetes mellitus) (HCC)     Plan:  - Refill metformin  - Prescribed Ozempic         Relevant Medications    metFORMIN (GLUCOPHAGE) 500 MG Tab    Semaglutide,0.25 or 0.5MG/DOS, (OZEMPIC, 0.25 OR 0.5 MG/DOSE,) 2 MG/3ML Solution Pen-injector    Vision blurring     Plan:  - Make referral to optometrist.         Hyperlipidemia     Plan:  - Prescribe rosuvastatin 5 mg daily         Relevant Medications    rosuvastatin (CRESTOR) 5 MG Tab     Other Visit Diagnoses       Genetic screening        Relevant Orders    Referral to Genetic Research Studies          Patient will schedule follow-up appointment for bilateral knee injections of Depo-Medrol.    Marc De León MD, MPH, PGY-1  [facility]  "

## 2024-03-07 NOTE — RESEARCH NOTE
Confirmed with the participant which designated provider they would like study results shared with (Marc De León). Patient will have an opportunity to share the results with any providers of their choosing in the future by accessing their results from [facility].

## 2024-03-07 NOTE — ASSESSMENT & PLAN NOTE
Plan:  - Refill ibuprofen 800 mg  - Patient will schedule follow-up appointment for Depo-Medrol injection of bilateral knees

## 2024-03-11 ENCOUNTER — HOSPITAL ENCOUNTER (OUTPATIENT)
Dept: LAB | Facility: MEDICAL CENTER | Age: 49
End: 2024-03-11
Payer: MEDICAID

## 2024-03-11 DIAGNOSIS — Z00.6 RESEARCH STUDY PATIENT: ICD-10-CM

## 2024-03-14 LAB
ELF SCORE: 10.46 PPM (ref 9.8–11.3)
RELATIVE RISK: ABNORMAL
RISK GROUP: ABNORMAL
RISK: 23.6 %

## 2024-03-19 DIAGNOSIS — K76.0 FATTY LIVER: ICD-10-CM

## 2024-03-19 DIAGNOSIS — R79.89 ABNORMAL LIVER FUNCTION TEST: ICD-10-CM

## 2024-03-19 PROBLEM — G47.30 SLEEP APNEA: Status: ACTIVE | Noted: 2024-03-08

## 2024-03-19 PROBLEM — F19.11 HISTORY OF SUBSTANCE ABUSE (HCC): Status: ACTIVE | Noted: 2018-05-24

## 2024-03-19 PROBLEM — R60.9 EDEMA: Status: ACTIVE | Noted: 2018-05-24

## 2024-03-19 PROBLEM — A54.01 GONOCOCCAL URETHRITIS: Status: ACTIVE | Noted: 2020-02-07

## 2024-03-19 PROBLEM — J44.9 CHRONIC OBSTRUCTIVE LUNG DISEASE (HCC): Status: ACTIVE | Noted: 2018-07-06

## 2024-03-19 PROBLEM — I27.20 PULMONARY HYPERTENSION (HCC): Status: ACTIVE | Noted: 2018-06-14

## 2024-03-19 PROBLEM — E78.5 DYSLIPIDEMIA: Status: ACTIVE | Noted: 2024-03-05

## 2024-03-19 PROBLEM — F17.201 TOBACCO DEPENDENCE IN REMISSION: Status: ACTIVE | Noted: 2018-07-06

## 2024-03-19 PROBLEM — N28.9 RENAL INSUFFICIENCY SYNDROME: Status: ACTIVE | Noted: 2024-03-05

## 2024-03-19 PROBLEM — I10 ESSENTIAL (PRIMARY) HYPERTENSION: Status: ACTIVE | Noted: 2018-06-14

## 2024-03-19 PROBLEM — I77.810 ASCENDING AORTA DILATATION (HCC): Status: ACTIVE | Noted: 2021-11-04

## 2024-03-19 PROBLEM — I50.9 HEART FAILURE, UNSPECIFIED (HCC): Status: ACTIVE | Noted: 2018-06-14

## 2024-03-27 ENCOUNTER — OFFICE VISIT (OUTPATIENT)
Dept: MEDICAL GROUP | Facility: CLINIC | Age: 49
End: 2024-03-27
Payer: MEDICAID

## 2024-03-27 VITALS
OXYGEN SATURATION: 94 % | DIASTOLIC BLOOD PRESSURE: 84 MMHG | TEMPERATURE: 97 F | SYSTOLIC BLOOD PRESSURE: 144 MMHG | WEIGHT: 315 LBS | HEIGHT: 78 IN | BODY MASS INDEX: 36.45 KG/M2 | HEART RATE: 82 BPM

## 2024-03-27 DIAGNOSIS — L97.509: ICD-10-CM

## 2024-03-27 DIAGNOSIS — E11.621: ICD-10-CM

## 2024-03-27 DIAGNOSIS — Z23 NEED FOR VACCINATION: ICD-10-CM

## 2024-03-27 LAB
APOB+LDLR+PCSK9 GENE MUT ANL BLD/T: NOT DETECTED
BRCA1+BRCA2 DEL+DUP + FULL MUT ANL BLD/T: NOT DETECTED
MLH1+MSH2+MSH6+PMS2 GN DEL+DUP+FUL M: NOT DETECTED

## 2024-03-27 RX ORDER — LOSARTAN POTASSIUM 50 MG/1
50 TABLET ORAL
COMMUNITY
Start: 2024-03-08 | End: 2024-03-28

## 2024-03-27 RX ORDER — HYDROCHLOROTHIAZIDE 12.5 MG/1
12.5 TABLET ORAL
COMMUNITY
Start: 2024-03-08 | End: 2024-03-28

## 2024-03-27 RX ORDER — METHYLPREDNISOLONE ACETATE 40 MG/ML
40 INJECTION, SUSPENSION INTRA-ARTICULAR; INTRALESIONAL; INTRAMUSCULAR; SOFT TISSUE ONCE
Status: COMPLETED | OUTPATIENT
Start: 2024-03-27 | End: 2024-03-27

## 2024-03-27 RX ORDER — SPIRONOLACTONE 25 MG/1
25 TABLET ORAL DAILY
COMMUNITY
Start: 2024-03-08 | End: 2025-03-08

## 2024-03-27 RX ORDER — LIDOCAINE HYDROCHLORIDE 20 MG/ML
3 INJECTION, SOLUTION INFILTRATION; PERINEURAL ONCE
Status: DISCONTINUED | OUTPATIENT
Start: 2024-03-27 | End: 2024-03-27

## 2024-03-27 RX ADMIN — METHYLPREDNISOLONE ACETATE 40 MG: 40 INJECTION, SUSPENSION INTRA-ARTICULAR; INTRALESIONAL; INTRAMUSCULAR; SOFT TISSUE at 14:56

## 2024-03-27 NOTE — PROCEDURES
Joint Injection Procedure Note    PRE-OP DIAGNOSIS: Osteoarthritis of right knee, right knee pain  POST-OP DIAGNOSIS: Same   PROCEDURE: joint injection  Performing Physician: Marc De León MD, MPH  Supervising Physician (if applicable): Deborah Sánchez MD     Dose: 2%   Lidocaine 3 mL    Steroid 40mg/mL Depomedrol x 1mL;  Exp 01/2025     Procedure: The area was prepped in the usual sterile manner.  Patient was placed on examination and instructed to lie in supine position with the knee extended, or slightly flexed and supported by a plastic container.  The superior aspect of the patella was located and a line was drawn vertically 1 cm superior to the proximal margin of the patella.  Additionally, a line was drawn horizontally along the posterior edge of the patella.  The intersection point was then identified and a shirlene was firmly placed to indent the skin-this would serve as the entry point for the needle.  The needle  was inserted into the affected area and the steroid was injected.   There were no complications during this procedure.     Followup: The patient tolerated the procedure well without complications.  Standard post-procedure care is explained and return precautions are given. Patient scheduled for left side knee joint injection tomorrow morning, 3/27/24.         Attending Dr. Sánchez was present for the entire procedure.

## 2024-03-28 ENCOUNTER — OFFICE VISIT (OUTPATIENT)
Dept: MEDICAL GROUP | Facility: CLINIC | Age: 49
End: 2024-03-28
Payer: MEDICAID

## 2024-03-28 ENCOUNTER — APPOINTMENT (OUTPATIENT)
Dept: RADIOLOGY | Facility: CLINIC | Age: 49
End: 2024-03-28
Attending: STUDENT IN AN ORGANIZED HEALTH CARE EDUCATION/TRAINING PROGRAM
Payer: MEDICAID

## 2024-03-28 VITALS
HEIGHT: 78 IN | SYSTOLIC BLOOD PRESSURE: 142 MMHG | OXYGEN SATURATION: 93 % | WEIGHT: 315 LBS | DIASTOLIC BLOOD PRESSURE: 78 MMHG | BODY MASS INDEX: 36.45 KG/M2 | HEART RATE: 78 BPM | TEMPERATURE: 97.6 F

## 2024-03-28 DIAGNOSIS — M25.562 CHRONIC PAIN OF LEFT KNEE: ICD-10-CM

## 2024-03-28 DIAGNOSIS — G89.29 CHRONIC PAIN OF LEFT KNEE: ICD-10-CM

## 2024-03-28 DIAGNOSIS — M17.12 OSTEOARTHRITIS OF LEFT KNEE, UNSPECIFIED OSTEOARTHRITIS TYPE: ICD-10-CM

## 2024-03-28 PROCEDURE — 73562 X-RAY EXAM OF KNEE 3: CPT | Mod: TC,LT | Performed by: RADIOLOGY

## 2024-03-28 RX ORDER — METHYLPREDNISOLONE ACETATE 40 MG/ML
40 INJECTION, SUSPENSION INTRA-ARTICULAR; INTRALESIONAL; INTRAMUSCULAR; SOFT TISSUE ONCE
Status: COMPLETED | OUTPATIENT
Start: 2024-03-28 | End: 2024-03-28

## 2024-03-28 RX ADMIN — METHYLPREDNISOLONE ACETATE 40 MG: 40 INJECTION, SUSPENSION INTRA-ARTICULAR; INTRALESIONAL; INTRAMUSCULAR; SOFT TISSUE at 10:56

## 2024-03-28 ASSESSMENT — PAIN SCALES - GENERAL: PAINLEVEL: NO PAIN

## 2024-03-28 NOTE — PROGRESS NOTES
Patient presented today for left knee injection for chronic left knee pain secondary to osteoarthritis. Xrays were obtained prior to procedure. Consented prior to procedure. Please see procedure not for further detail.

## 2024-03-28 NOTE — PROCEDURES
PROCEDURE NOTE:  left knee corticosteroid injection  Consent was obtained. Using sterile technique the left knee was prepped   Infra-lateral patellar approach.   Steroid Depo Medrol 40 mg and 4cc plain lidocaine was then injected and the needle withdrawn.    Lot number:[id number]  Expiration date: 1/2025  The procedure was well tolerated.      Watch for fever, or increased swelling or persistent pain in knee. Call or return to clinic prn if such symptoms occur or the knee fails to improve as anticipated.    Dr. Lopez was the attending physician and was present for the entire procedure.

## 2024-04-08 ENCOUNTER — HOSPITAL ENCOUNTER (OUTPATIENT)
Dept: RADIOLOGY | Facility: MEDICAL CENTER | Age: 49
End: 2024-04-08
Attending: FAMILY MEDICINE
Payer: MEDICAID

## 2024-04-08 DIAGNOSIS — R79.89 ABNORMAL LIVER FUNCTION TEST: ICD-10-CM

## 2024-04-08 DIAGNOSIS — K76.0 FATTY LIVER: ICD-10-CM

## 2024-04-08 PROCEDURE — 76705 ECHO EXAM OF ABDOMEN: CPT

## 2024-12-18 DIAGNOSIS — R73.03 PREDIABETES: ICD-10-CM

## 2024-12-19 NOTE — TELEPHONE ENCOUNTER
Received request via: Pharmacy    Was the patient seen in the last year in this department? Yes    Does the patient have an active prescription (recently filled or refills available) for medication(s) requested? No    Pharmacy Name: [facility]    Does the patient have [facility] Plus and need 100-day supply? (This applies to ALL medications) Patient does not have SCP

## 2025-07-12 DIAGNOSIS — R73.03 PREDIABETES: ICD-10-CM

## (undated) DEVICE — NEPTUNE 4 PORT MANIFOLD - (20/PK)

## (undated) DEVICE — WATER IRRIGATION STERILE 1000ML (12EA/CA)

## (undated) DEVICE — PROTECTOR ULNA NERVE - (36PR/CA)

## (undated) DEVICE — HUMID-VENT HEAT AND MOISTURE EXCHANGE- (50/BX)

## (undated) DEVICE — BAG, SPONGE COUNT 50600

## (undated) DEVICE — SLEEVE, VASO, THIGH, MED

## (undated) DEVICE — SCISSORS 5MM CVD (6EA/BX)

## (undated) DEVICE — HEAD HOLDER JUNIOR/ADULT

## (undated) DEVICE — TROCAR 5X100 NON BLADED Z-TH - READ KII (6/BX)

## (undated) DEVICE — DEVICE 5MM ABSRB STRAP FIXATION  (6EA/BX)

## (undated) DEVICE — SUTURE 0 VICRYL PLUS UR-6 - 27 INCH (36/BX)

## (undated) DEVICE — TROCAR LAPSCP 100MM 12MM NTHRD - (6/BX)

## (undated) DEVICE — TRAY CATHETER FOLEY URINE METER W/STATLOCK 350ML (10EA/CA)

## (undated) DEVICE — SPONGE GAUZESTER. 2X2 4-PL - (2/PK 50PK/BX 30BX/CS)

## (undated) DEVICE — GLOVE BIOGEL SZ 8 SURGICAL PF LTX - (50PR/BX 4BX/CA)

## (undated) DEVICE — BLADE SURGICAL CLIPPER - (50EA/CA)

## (undated) DEVICE — CHLORAPREP 26 ML APPLICATOR - ORANGE TINT(25/CA)

## (undated) DEVICE — ELECTRODE DUAL RETURN W/ CORD - (50/PK)

## (undated) DEVICE — SUTURE 4-0 VICRYL PLUS FS-2 - 27 INCH (36/BX)

## (undated) DEVICE — KIT ANESTHESIA W/CIRCUIT & 3/LT BAG W/FILTER (20EA/CA)

## (undated) DEVICE — KIT ROOM DECONTAMINATION

## (undated) DEVICE — CANNULA W/SEAL 5X100 Z-THRE - ADED KII (12/BX)

## (undated) DEVICE — TUBE CONNECTING SUCTION - CLEAR PLASTIC STERILE 72 IN (50EA/CA)

## (undated) DEVICE — GLOVE BIOGEL SZ 7.5 SURGICAL PF LTX - (50PR/BX 4BX/CA)

## (undated) DEVICE — SENSOR SPO2 NEO LNCS ADHESIVE (20/BX) SEE USER NOTES

## (undated) DEVICE — DRESSING TRANSPARENT FILM TEGADERM 2.375 X 2.75"  (100EA/BX)"

## (undated) DEVICE — ELECTRODE 850 FOAM ADHESIVE - HYDROGEL RADIOTRNSPRNT (50/PK)

## (undated) DEVICE — SYSTEM DISSECTION BALLOON  KII ROUND WITH 2 EACH 5MM LOW PROFILE TROCARS (3EA/BX)

## (undated) DEVICE — GLOVE BIOGEL M SZ 8 SURGICAL PF LTX - (50/BX 4BX/CA)

## (undated) DEVICE — GOWN WARMING STANDARD FLEX - (30/CA)

## (undated) DEVICE — TUBING INSUFFLATION - (10/BX)

## (undated) DEVICE — GLOVE, LITE (PAIR)

## (undated) DEVICE — MASK ANESTHESIA ADULT  - (100/CA)

## (undated) DEVICE — SUCTION INSTRUMENT YANKAUER BULBOUS TIP W/O VENT (50EA/CA)

## (undated) DEVICE — SYRINGE 30 ML LL (56/BX)

## (undated) DEVICE — Device

## (undated) DEVICE — SODIUM CHL IRRIGATION 0.9% 1000ML (12EA/CA)

## (undated) DEVICE — CANISTER SUCTION RIGID RED 1500CC (40EA/CA)

## (undated) DEVICE — GLOVE BIOGEL INDICATOR SZ 8 SURGICAL PF LTX - (50/BX 4BX/CA)